# Patient Record
Sex: FEMALE | Race: WHITE | NOT HISPANIC OR LATINO | URBAN - METROPOLITAN AREA
[De-identification: names, ages, dates, MRNs, and addresses within clinical notes are randomized per-mention and may not be internally consistent; named-entity substitution may affect disease eponyms.]

---

## 2017-11-05 ENCOUNTER — EMERGENCY (EMERGENCY)
Facility: HOSPITAL | Age: 72
LOS: 0 days | Discharge: HOME | End: 2017-11-05

## 2017-11-10 DIAGNOSIS — M79.89 OTHER SPECIFIED SOFT TISSUE DISORDERS: ICD-10-CM

## 2017-11-10 DIAGNOSIS — Z79.82 LONG TERM (CURRENT) USE OF ASPIRIN: ICD-10-CM

## 2017-11-10 DIAGNOSIS — E78.00 PURE HYPERCHOLESTEROLEMIA, UNSPECIFIED: ICD-10-CM

## 2017-11-10 DIAGNOSIS — Z88.2 ALLERGY STATUS TO SULFONAMIDES: ICD-10-CM
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
## 2017-11-10 DIAGNOSIS — Z88.1 ALLERGY STATUS TO OTHER ANTIBIOTIC AGENTS STATUS: ICD-10-CM

## 2017-11-10 DIAGNOSIS — S46.392A OTHER INJURY OF MUSCLE, FASCIA AND TENDON OF TRICEPS, LEFT ARM, INITIAL ENCOUNTER: ICD-10-CM

## 2017-11-10 DIAGNOSIS — Z79.899 OTHER LONG TERM (CURRENT) DRUG THERAPY: ICD-10-CM

## 2017-11-10 DIAGNOSIS — S49.92XA UNSPECIFIED INJURY OF LEFT SHOULDER AND UPPER ARM, INITIAL ENCOUNTER: ICD-10-CM
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
## 2017-11-10 DIAGNOSIS — I10 ESSENTIAL (PRIMARY) HYPERTENSION: ICD-10-CM

## 2017-11-10 PROBLEM — Z00.00 ENCOUNTER FOR PREVENTIVE HEALTH EXAMINATION: Status: ACTIVE | Noted: 2017-11-10

## 2017-12-11 ENCOUNTER — OUTPATIENT (OUTPATIENT)
Dept: OUTPATIENT SERVICES | Facility: HOSPITAL | Age: 72
LOS: 1 days | Discharge: HOME | End: 2017-12-11

## 2017-12-11 DIAGNOSIS — M79.602 PAIN IN LEFT ARM: ICD-10-CM

## 2020-09-03 ENCOUNTER — APPOINTMENT (OUTPATIENT)
Dept: UROLOGY | Facility: CLINIC | Age: 75
End: 2020-09-03
Payer: MEDICARE

## 2020-09-03 VITALS
HEART RATE: 81 BPM | HEIGHT: 65 IN | BODY MASS INDEX: 41.65 KG/M2 | WEIGHT: 250 LBS | DIASTOLIC BLOOD PRESSURE: 73 MMHG | TEMPERATURE: 97.3 F | SYSTOLIC BLOOD PRESSURE: 119 MMHG

## 2020-09-03 DIAGNOSIS — I10 ESSENTIAL (PRIMARY) HYPERTENSION: ICD-10-CM

## 2020-09-03 DIAGNOSIS — Z87.891 PERSONAL HISTORY OF NICOTINE DEPENDENCE: ICD-10-CM

## 2020-09-03 DIAGNOSIS — E78.00 PURE HYPERCHOLESTEROLEMIA, UNSPECIFIED: ICD-10-CM

## 2020-09-03 DIAGNOSIS — Z78.9 OTHER SPECIFIED HEALTH STATUS: ICD-10-CM

## 2020-09-03 PROCEDURE — 99203 OFFICE O/P NEW LOW 30 MIN: CPT

## 2020-09-03 RX ORDER — LORATADINE 10 MG/1
10 CAPSULE, LIQUID FILLED ORAL
Refills: 0 | Status: ACTIVE | COMMUNITY

## 2020-09-03 RX ORDER — ATORVASTATIN CALCIUM 10 MG/1
10 TABLET, FILM COATED ORAL
Refills: 0 | Status: ACTIVE | COMMUNITY

## 2020-09-03 RX ORDER — FUROSEMIDE 20 MG/1
20 TABLET ORAL
Refills: 0 | Status: ACTIVE | COMMUNITY

## 2020-09-03 RX ORDER — METFORMIN HYDROCHLORIDE 500 MG/1
500 TABLET, COATED ORAL
Refills: 0 | Status: ACTIVE | COMMUNITY

## 2020-09-03 NOTE — HISTORY OF PRESENT ILLNESS
[None] : There is no radiation [7] : 7 [Gradual] : gradual [Moderate] : moderate in severity [Constant] : constantly [Unchanged] : unchanged [FreeTextEntry1] : Justin is a 75-year-old morbidly obese female who for some time at least 6 to 12 months if not more has had Mid to Lower thoracic Back pain. Bothersome or when she's moving or sometimes even lying flat. If she's in a chair with good support or relatively still it doesn't bother her. Going on and off a step getting in and out of the chair is excruciatingly painful. She had been seeing a rehabilitation therapist when she was in [place] which helped. She went down to [place] for the winter did not see a therapist and the pain got quite severe. She had numerous x-rays ultrasounds CAT scans etc. which showed bilateral cyst on the left going up to 11 cm (prior studies done in 2005. [place] showed a small left renal cyst but that was 15 years ago. Someone told her they think the pain is due to the system therefore she sent to a urologist.\par \par She denies any voiding issues. [de-identified] : Mid to lower thoracic back pain [de-identified] : For at least six perhaps up to 12 months [de-identified] : Movement [de-identified] : Rehabilitation medicine, good back support

## 2020-09-03 NOTE — LETTER HEADER
[FreeTextEntry3] : Elisa Kirk M.D.\par Director of Urology\par [facility]\par [address] [place] [address], [address]\[place] [address]

## 2020-09-03 NOTE — LETTER BODY
[Dear  ___] : Dear  [unfilled], [Consult Letter:] : I had the pleasure of evaluating your patient, [unfilled]. [Please see my note below.] : Please see my note below. [Sincerely,] : Sincerely, [Consult Closing:] : Thank you very much for allowing me to participate in the care of this patient.  If you have any questions, please do not hesitate to contact me. [FreeTextEntry2] : Josiane Brink MD\par [address]\[place] [address] \par

## 2020-09-03 NOTE — ASSESSMENT
[FreeTextEntry1] : You I don't see anything that recently looks at the kidneys other than a CT report that says bilateral cyst without being specific and that radiologist order back pain is due to the cyst. If you do rehab you feel better you start rehab you feel worse your pain bothers you when you move but not when you're lying still you have a use panniculus which is pulling you forward while you walk to the point that you are afraid you will fall in effect recently Jessica Levine knows I don't think the issue here is urologic.\par \par I would like to have a solid study of her kidneys at least as a baseline so we will sent her for a renal ultrasound and we can review that with telemedicine. However at this point I think she needs to go to a rehabilitation medicine doctor and start physical therapy\par \par she also needs nutrition counseling ? bariatrics consult

## 2020-09-03 NOTE — PHYSICAL EXAM
[General Appearance - Well Nourished] : well nourished [General Appearance - Well Developed] : well developed [Well Groomed] : well groomed [General Appearance - In No Acute Distress] : no acute distress [Normal Appearance] : normal appearance [] : no respiratory distress [Respiration, Rhythm And Depth] : normal respiratory rhythm and effort [Abdomen Soft] : soft [Abdomen Tenderness] : non-tender [Exaggerated Use Of Accessory Muscles For Inspiration] : no accessory muscle use [Costovertebral Angle Tenderness] : no ~M costovertebral angle tenderness [Oriented To Time, Place, And Person] : oriented to person, place, and time [Affect] : the affect was normal [Not Anxious] : not anxious [Mood] : the mood was normal [FreeTextEntry1] : dtr normal

## 2020-09-24 ENCOUNTER — APPOINTMENT (OUTPATIENT)
Dept: UROLOGY | Facility: CLINIC | Age: 75
End: 2020-09-24
Payer: MEDICARE

## 2020-09-24 DIAGNOSIS — N28.1 CYST OF KIDNEY, ACQUIRED: ICD-10-CM

## 2020-09-24 PROCEDURE — 99214 OFFICE O/P EST MOD 30 MIN: CPT | Mod: 95

## 2020-09-24 NOTE — HISTORY OF PRESENT ILLNESS
[Home] : at home, [unfilled] , at the time of the visit. [Medical Office: ([facility])___] : at the medical office located in  [FreeTextEntry3] : he has received, reviewed and agreed to the telemedicine consent  [FreeTextEntry1] : I communicated with her by her cell phone at [phone] and we will use her husbands phone at \par [phone]  any email communications will be sent to [email or web address]\par \par Twyla is a 75-year-old female seen on September 3, 2020 for intermittent mid to lower thoracic back pain that sounded more musculoskeletal. One of her studies that showed bilateral cyst with one on the left going up to 11 cm and that order pain was due to the cyst. She had brought a disk but it didn’t function and I felt that if you do rehab you feel better if you stop you feel worse your pain bothers you when you are moving not when you’re lying still and you have a huge panniculus pulling you forward to the point that you have trouble walking I did think it was urologic. I wanted to get a kidney study so we have something as a baseline so we sent her for renal ultrasound. She is now using telemedicine to review it.\par  [7] : 7 [None] : There is no radiation [Gradual] : gradual [Constant] : constantly [Moderate] : moderate in severity [Unchanged] : unchanged [de-identified] : Mid to lower thoracic back pain [de-identified] : For at least six perhaps up to 12 months [de-identified] : Movement [de-identified] : Rehabilitation medicine, good back support

## 2020-09-24 NOTE — LETTER HEADER
[FreeTextEntry3] : Elisa Kirk M.D.\par Director of Urology\par [facility]\par [address] [place] [address], [address]\[place] [address]

## 2020-09-24 NOTE — LETTER BODY
[Dear  ___] : Dear  [unfilled], [Courtesy Letter:] : I had the pleasure of seeing your patient, [unfilled], in my office today. [Please see my note below.] : Please see my note below. [Sincerely,] : Sincerely, [FreeTextEntry2] : Josiane Brink MD\par [address]\[place] [address] \par

## 2020-09-24 NOTE — PHYSICAL EXAM
[General Appearance - Well Developed] : well developed [General Appearance - Well Nourished] : well nourished [Normal Appearance] : normal appearance [Well Groomed] : well groomed [General Appearance - In No Acute Distress] : no acute distress [FreeTextEntry1] : morbidly obese with large paniculus [] : no respiratory distress [Respiration, Rhythm And Depth] : normal respiratory rhythm and effort [Exaggerated Use Of Accessory Muscles For Inspiration] : no accessory muscle use [Oriented To Time, Place, And Person] : oriented to person, place, and time [Affect] : the affect was normal [Mood] : the mood was normal [Not Anxious] : not anxious [Normal Station and Gait] : the gait and station were normal for the patient's age

## 2020-09-24 NOTE — ASSESSMENT
[FreeTextEntry1] : Practically speaking these are benign cysts though they are enlarging they do not appear to be obstructing her kidney and they are definitely not the cause of her back pain. I believe this is her weight stressing her back causing her problems. As before she needs a nutritionist and physical therapist. This is not something that I can refer her to she misunderstood me or I misstated the last time is something that she needs to arrange through PCP.

## 2022-07-10 ENCOUNTER — INPATIENT (INPATIENT)
Facility: HOSPITAL | Age: 77
LOS: 2 days | Discharge: HOME | End: 2022-07-13
Attending: INTERNAL MEDICINE | Admitting: INTERNAL MEDICINE

## 2022-07-10 VITALS
TEMPERATURE: 98 F | RESPIRATION RATE: 16 BRPM | HEART RATE: 74 BPM | WEIGHT: 199.96 LBS | SYSTOLIC BLOOD PRESSURE: 153 MMHG | OXYGEN SATURATION: 97 % | DIASTOLIC BLOOD PRESSURE: 75 MMHG

## 2022-07-10 LAB
ALBUMIN SERPL ELPH-MCNC: 4.4 G/DL — SIGNIFICANT CHANGE UP (ref 3.5–5.2)
ALP SERPL-CCNC: 100 U/L — SIGNIFICANT CHANGE UP (ref 30–115)
ALT FLD-CCNC: 38 U/L — SIGNIFICANT CHANGE UP (ref 0–41)
ANION GAP SERPL CALC-SCNC: 15 MMOL/L — HIGH (ref 7–14)
APTT BLD: 28.9 SEC — SIGNIFICANT CHANGE UP (ref 27–39.2)
AST SERPL-CCNC: 28 U/L — SIGNIFICANT CHANGE UP (ref 0–41)
BASOPHILS # BLD AUTO: 0.06 K/UL — SIGNIFICANT CHANGE UP (ref 0–0.2)
BASOPHILS NFR BLD AUTO: 0.8 % — SIGNIFICANT CHANGE UP (ref 0–1)
BILIRUB SERPL-MCNC: 0.4 MG/DL — SIGNIFICANT CHANGE UP (ref 0.2–1.2)
BUN SERPL-MCNC: 16 MG/DL — SIGNIFICANT CHANGE UP (ref 10–20)
CALCIUM SERPL-MCNC: 9.6 MG/DL — SIGNIFICANT CHANGE UP (ref 8.5–10.1)
CHLORIDE SERPL-SCNC: 104 MMOL/L — SIGNIFICANT CHANGE UP (ref 98–110)
CO2 SERPL-SCNC: 26 MMOL/L — SIGNIFICANT CHANGE UP (ref 17–32)
CREAT SERPL-MCNC: 0.9 MG/DL — SIGNIFICANT CHANGE UP (ref 0.7–1.5)
EGFR: 67 ML/MIN/1.73M2 — SIGNIFICANT CHANGE UP
EOSINOPHIL # BLD AUTO: 0.16 K/UL — SIGNIFICANT CHANGE UP (ref 0–0.7)
EOSINOPHIL NFR BLD AUTO: 2.1 % — SIGNIFICANT CHANGE UP (ref 0–8)
GLUCOSE SERPL-MCNC: 127 MG/DL — HIGH (ref 70–99)
HCT VFR BLD CALC: 43.9 % — SIGNIFICANT CHANGE UP (ref 37–47)
HGB BLD-MCNC: 14.4 G/DL — SIGNIFICANT CHANGE UP (ref 12–16)
IMM GRANULOCYTES NFR BLD AUTO: 0.4 % — HIGH (ref 0.1–0.3)
INR BLD: 1.05 RATIO — SIGNIFICANT CHANGE UP (ref 0.65–1.3)
LYMPHOCYTES # BLD AUTO: 2.01 K/UL — SIGNIFICANT CHANGE UP (ref 1.2–3.4)
LYMPHOCYTES # BLD AUTO: 26.4 % — SIGNIFICANT CHANGE UP (ref 20.5–51.1)
MAGNESIUM SERPL-MCNC: 2 MG/DL — SIGNIFICANT CHANGE UP (ref 1.8–2.4)
MCHC RBC-ENTMCNC: 26.8 PG — LOW (ref 27–31)
MCHC RBC-ENTMCNC: 32.8 G/DL — SIGNIFICANT CHANGE UP (ref 32–37)
MCV RBC AUTO: 81.8 FL — SIGNIFICANT CHANGE UP (ref 81–99)
MONOCYTES # BLD AUTO: 0.53 K/UL — SIGNIFICANT CHANGE UP (ref 0.1–0.6)
MONOCYTES NFR BLD AUTO: 7 % — SIGNIFICANT CHANGE UP (ref 1.7–9.3)
NEUTROPHILS # BLD AUTO: 4.82 K/UL — SIGNIFICANT CHANGE UP (ref 1.4–6.5)
NEUTROPHILS NFR BLD AUTO: 63.3 % — SIGNIFICANT CHANGE UP (ref 42.2–75.2)
NRBC # BLD: 0 /100 WBCS — SIGNIFICANT CHANGE UP (ref 0–0)
PLATELET # BLD AUTO: 300 K/UL — SIGNIFICANT CHANGE UP (ref 130–400)
POTASSIUM SERPL-MCNC: 3.9 MMOL/L — SIGNIFICANT CHANGE UP (ref 3.5–5)
POTASSIUM SERPL-SCNC: 3.9 MMOL/L — SIGNIFICANT CHANGE UP (ref 3.5–5)
PROT SERPL-MCNC: 6.9 G/DL — SIGNIFICANT CHANGE UP (ref 6–8)
PROTHROM AB SERPL-ACNC: 12.1 SEC — SIGNIFICANT CHANGE UP (ref 9.95–12.87)
RBC # BLD: 5.37 M/UL — SIGNIFICANT CHANGE UP (ref 4.2–5.4)
RBC # FLD: 15.8 % — HIGH (ref 11.5–14.5)
SARS-COV-2 RNA SPEC QL NAA+PROBE: SIGNIFICANT CHANGE UP
SODIUM SERPL-SCNC: 145 MMOL/L — SIGNIFICANT CHANGE UP (ref 135–146)
TROPONIN T SERPL-MCNC: <0.01 NG/ML — SIGNIFICANT CHANGE UP
WBC # BLD: 7.61 K/UL — SIGNIFICANT CHANGE UP (ref 4.8–10.8)
WBC # FLD AUTO: 7.61 K/UL — SIGNIFICANT CHANGE UP (ref 4.8–10.8)

## 2022-07-10 PROCEDURE — 93010 ELECTROCARDIOGRAM REPORT: CPT

## 2022-07-10 PROCEDURE — 99285 EMERGENCY DEPT VISIT HI MDM: CPT | Mod: FS

## 2022-07-10 PROCEDURE — 71045 X-RAY EXAM CHEST 1 VIEW: CPT | Mod: 26

## 2022-07-10 RX ORDER — SODIUM CHLORIDE 9 MG/ML
1000 INJECTION, SOLUTION INTRAVENOUS ONCE
Refills: 0 | Status: COMPLETED | OUTPATIENT
Start: 2022-07-10 | End: 2022-07-10

## 2022-07-10 RX ORDER — GLUCAGON INJECTION, SOLUTION 0.5 MG/.1ML
2 INJECTION, SOLUTION SUBCUTANEOUS ONCE
Refills: 0 | Status: COMPLETED | OUTPATIENT
Start: 2022-07-10 | End: 2022-07-10

## 2022-07-10 RX ADMIN — GLUCAGON INJECTION, SOLUTION 2 MILLIGRAM(S): 0.5 INJECTION, SOLUTION SUBCUTANEOUS at 21:30

## 2022-07-10 RX ADMIN — SODIUM CHLORIDE 1000 MILLILITER(S): 9 INJECTION, SOLUTION INTRAVENOUS at 19:19

## 2022-07-10 RX ADMIN — GLUCAGON INJECTION, SOLUTION 2 MILLIGRAM(S): 0.5 INJECTION, SOLUTION SUBCUTANEOUS at 20:09

## 2022-07-10 NOTE — ED PROVIDER NOTE - NS ED ATTENDING STATEMENT MOD
This was a shared visit with the AUDIE. I reviewed and verified the documentation and independently performed the documented:

## 2022-07-10 NOTE — ED PROVIDER NOTE - ATTENDING APP SHARED VISIT CONTRIBUTION OF CARE
77-year-old female to ED with difficulty swallowing.  Patient states last night she tried to eat extra chocolate and suddenly felt her throat get stuck and was not able to drink or swallow anything since then.  Given to ED has not difficulty tolerating secretions.  No nausea vomiting otherwise well-appearing nontoxic.  But feeling very uncomfortable. Exam as noted

## 2022-07-10 NOTE — ED PROVIDER NOTE - NS ED ROS FT
Review of Systems  Constitutional:  No fever, chills.  Eyes:  No visual changes, eye pain, or discharge.  ENMT:  No hearing changes, pain, or discharge. No nasal congestion, discharge, or bleeding. No throat pain, swelling. (+) difficulty swallowing  Cardiac:  No chest pain, palpitations, syncope, or edema.  Respiratory:  No dyspnea, cough. No hemoptysis.  GI:  No nausea, vomiting, diarrhea, or abdominal pain.   :  No dysuria, hematuria, frequency, or burning.   MS:  No back pain.  Skin:  No skin rash, pruritis, jaundice, or lesions.  Neuro:  No headache, dizziness, loss of sensation, or focal weakness.  No change in mental status.

## 2022-07-10 NOTE — ED PROVIDER NOTE - PHYSICAL EXAMINATION
VITAL SIGNS: I have reviewed nursing notes and confirm.  CONSTITUTIONAL: 78 yo F sitting up on stretcher; in no acute distress.  SKIN: Skin exam is warm and dry, no acute rash.  HEAD: Normocephalic; atraumatic.  EYES: Conjunctiva and sclera clear.  ENT: No nasal discharge; airway clear. Oropharynx clear, uvula midline.   CARD: S1, S2 normal; no murmurs, gallops, or rubs. Regular rate and rhythm.  RESP: No wheezes, rales or rhonchi. Speaking in full sentences.   ABD: Normal bowel sounds; soft; non-distended; non-tender; No rebound or guarding. No CVA tenderness.  EXT: Normal ROM. No clubbing, cyanosis or edema.  NEURO: Alert, oriented. Grossly unremarkable. No focal deficits.

## 2022-07-10 NOTE — ED ADULT TRIAGE NOTE - CHIEF COMPLAINT QUOTE
Pt c/p inability to swallow food or water. Pt states over the past week and a half it has gotten worse. Pt states this has happened to her in the past but never followed up with a doctor for it. Pt reports no difficulty breathing. Pt c/p inability to swallow food, water, or saliva. Pt states over the past week and a half it has gotten worse. Pt states this has happened to her in the past but never followed up with a doctor for it. Pt reports no difficulty breathing.

## 2022-07-10 NOTE — ED ADULT NURSE NOTE - CHIEF COMPLAINT QUOTE
Pt c/p inability to swallow food, water, or saliva. Pt states over the past week and a half it has gotten worse. Pt states this has happened to her in the past but never followed up with a doctor for it. Pt reports no difficulty breathing.

## 2022-07-10 NOTE — ED PROVIDER NOTE - OBJECTIVE STATEMENT
76 yo F with PMHx of HTN, HLD, and DM presents to the ED c/o worsening dysphagia x 4 weeks. Pt states she has had progressively increasing difficulty swallowing solids which progressed to inability to swallow liquids today. Pt states she last ate today around 5 PM--had a piece of chocolate and has been unable to tolerate liquid intake since. Prior to that she ate a hot dog and a small piece of chicken in the afternoon. She denies other complaints. Pt denies fever, chills, nausea, vomiting, abdominal pain, diarrhea, headache, dizziness, weakness, chest pain, SOB, back pain, LOC, trauma, urinary symptoms, cough, calf pain/swelling, recent travel, recent surgery.

## 2022-07-10 NOTE — ED ADULT NURSE NOTE - OBJECTIVE STATEMENT
Pt c/p inability to swallow food, water, or saliva. Pt states over the past week and a half it has gotten worse. Pt states this has happened to her in the past but never followed up with a doctor for it. Pt reports no difficulty breathing. No SOB or distress noted.

## 2022-07-11 ENCOUNTER — TRANSCRIPTION ENCOUNTER (OUTPATIENT)
Age: 77
End: 2022-07-11

## 2022-07-11 ENCOUNTER — RESULT REVIEW (OUTPATIENT)
Age: 77
End: 2022-07-11

## 2022-07-11 DIAGNOSIS — Z90.49 ACQUIRED ABSENCE OF OTHER SPECIFIED PARTS OF DIGESTIVE TRACT: Chronic | ICD-10-CM

## 2022-07-11 LAB
A1C WITH ESTIMATED AVERAGE GLUCOSE RESULT: 6.5 % — HIGH (ref 4–5.6)
ALBUMIN SERPL ELPH-MCNC: 4 G/DL — SIGNIFICANT CHANGE UP (ref 3.5–5.2)
ALP SERPL-CCNC: 90 U/L — SIGNIFICANT CHANGE UP (ref 30–115)
ALT FLD-CCNC: 31 U/L — SIGNIFICANT CHANGE UP (ref 0–41)
ANION GAP SERPL CALC-SCNC: 13 MMOL/L — SIGNIFICANT CHANGE UP (ref 7–14)
AST SERPL-CCNC: 20 U/L — SIGNIFICANT CHANGE UP (ref 0–41)
BASOPHILS # BLD AUTO: 0.07 K/UL — SIGNIFICANT CHANGE UP (ref 0–0.2)
BASOPHILS NFR BLD AUTO: 0.9 % — SIGNIFICANT CHANGE UP (ref 0–1)
BILIRUB SERPL-MCNC: 0.5 MG/DL — SIGNIFICANT CHANGE UP (ref 0.2–1.2)
BUN SERPL-MCNC: 13 MG/DL — SIGNIFICANT CHANGE UP (ref 10–20)
CALCIUM SERPL-MCNC: 8.8 MG/DL — SIGNIFICANT CHANGE UP (ref 8.5–10.1)
CHLORIDE SERPL-SCNC: 107 MMOL/L — SIGNIFICANT CHANGE UP (ref 98–110)
CHOLEST SERPL-MCNC: 143 MG/DL — SIGNIFICANT CHANGE UP
CO2 SERPL-SCNC: 25 MMOL/L — SIGNIFICANT CHANGE UP (ref 17–32)
CREAT SERPL-MCNC: 0.8 MG/DL — SIGNIFICANT CHANGE UP (ref 0.7–1.5)
EGFR: 77 ML/MIN/1.73M2 — SIGNIFICANT CHANGE UP
EOSINOPHIL # BLD AUTO: 0.17 K/UL — SIGNIFICANT CHANGE UP (ref 0–0.7)
EOSINOPHIL NFR BLD AUTO: 2.1 % — SIGNIFICANT CHANGE UP (ref 0–8)
ESTIMATED AVERAGE GLUCOSE: 140 MG/DL — HIGH (ref 68–114)
GLUCOSE BLDC GLUCOMTR-MCNC: 106 MG/DL — HIGH (ref 70–99)
GLUCOSE BLDC GLUCOMTR-MCNC: 108 MG/DL — HIGH (ref 70–99)
GLUCOSE BLDC GLUCOMTR-MCNC: 131 MG/DL — HIGH (ref 70–99)
GLUCOSE SERPL-MCNC: 111 MG/DL — HIGH (ref 70–99)
HCT VFR BLD CALC: 38 % — SIGNIFICANT CHANGE UP (ref 37–47)
HCV AB S/CO SERPL IA: 0.03 COI — SIGNIFICANT CHANGE UP
HCV AB SERPL-IMP: SIGNIFICANT CHANGE UP
HDLC SERPL-MCNC: 42 MG/DL — LOW
HGB BLD-MCNC: 12.5 G/DL — SIGNIFICANT CHANGE UP (ref 12–16)
IMM GRANULOCYTES NFR BLD AUTO: 0.2 % — SIGNIFICANT CHANGE UP (ref 0.1–0.3)
LIPID PNL WITH DIRECT LDL SERPL: 75 MG/DL — SIGNIFICANT CHANGE UP
LYMPHOCYTES # BLD AUTO: 2.45 K/UL — SIGNIFICANT CHANGE UP (ref 1.2–3.4)
LYMPHOCYTES # BLD AUTO: 30.2 % — SIGNIFICANT CHANGE UP (ref 20.5–51.1)
MCHC RBC-ENTMCNC: 26.9 PG — LOW (ref 27–31)
MCHC RBC-ENTMCNC: 32.9 G/DL — SIGNIFICANT CHANGE UP (ref 32–37)
MCV RBC AUTO: 81.9 FL — SIGNIFICANT CHANGE UP (ref 81–99)
MONOCYTES # BLD AUTO: 0.5 K/UL — SIGNIFICANT CHANGE UP (ref 0.1–0.6)
MONOCYTES NFR BLD AUTO: 6.2 % — SIGNIFICANT CHANGE UP (ref 1.7–9.3)
NEUTROPHILS # BLD AUTO: 4.9 K/UL — SIGNIFICANT CHANGE UP (ref 1.4–6.5)
NEUTROPHILS NFR BLD AUTO: 60.4 % — SIGNIFICANT CHANGE UP (ref 42.2–75.2)
NON HDL CHOLESTEROL: 101 MG/DL — SIGNIFICANT CHANGE UP
NRBC # BLD: 0 /100 WBCS — SIGNIFICANT CHANGE UP (ref 0–0)
PLATELET # BLD AUTO: 260 K/UL — SIGNIFICANT CHANGE UP (ref 130–400)
POTASSIUM SERPL-MCNC: 3.7 MMOL/L — SIGNIFICANT CHANGE UP (ref 3.5–5)
POTASSIUM SERPL-SCNC: 3.7 MMOL/L — SIGNIFICANT CHANGE UP (ref 3.5–5)
PROT SERPL-MCNC: 5.9 G/DL — LOW (ref 6–8)
RBC # BLD: 4.64 M/UL — SIGNIFICANT CHANGE UP (ref 4.2–5.4)
RBC # FLD: 15.9 % — HIGH (ref 11.5–14.5)
SODIUM SERPL-SCNC: 145 MMOL/L — SIGNIFICANT CHANGE UP (ref 135–146)
TRIGL SERPL-MCNC: 130 MG/DL — SIGNIFICANT CHANGE UP
WBC # BLD: 8.11 K/UL — SIGNIFICANT CHANGE UP (ref 4.8–10.8)
WBC # FLD AUTO: 8.11 K/UL — SIGNIFICANT CHANGE UP (ref 4.8–10.8)

## 2022-07-11 PROCEDURE — 88305 TISSUE EXAM BY PATHOLOGIST: CPT | Mod: 26

## 2022-07-11 PROCEDURE — 88312 SPECIAL STAINS GROUP 1: CPT | Mod: 26

## 2022-07-11 PROCEDURE — 99223 1ST HOSP IP/OBS HIGH 75: CPT

## 2022-07-11 PROCEDURE — 43239 EGD BIOPSY SINGLE/MULTIPLE: CPT

## 2022-07-11 PROCEDURE — 93010 ELECTROCARDIOGRAM REPORT: CPT

## 2022-07-11 PROCEDURE — 99233 SBSQ HOSP IP/OBS HIGH 50: CPT | Mod: 25

## 2022-07-11 RX ORDER — ASPIRIN/CALCIUM CARB/MAGNESIUM 324 MG
325 TABLET ORAL DAILY
Refills: 0 | Status: DISCONTINUED | OUTPATIENT
Start: 2022-07-11 | End: 2022-07-12

## 2022-07-11 RX ORDER — ATORVASTATIN CALCIUM 80 MG/1
1 TABLET, FILM COATED ORAL
Qty: 0 | Refills: 0 | DISCHARGE

## 2022-07-11 RX ORDER — FUROSEMIDE 40 MG
1 TABLET ORAL
Qty: 0 | Refills: 0 | DISCHARGE

## 2022-07-11 RX ORDER — VALSARTAN 80 MG/1
80 TABLET ORAL DAILY
Refills: 0 | Status: DISCONTINUED | OUTPATIENT
Start: 2022-07-11 | End: 2022-07-13

## 2022-07-11 RX ORDER — ONDANSETRON 8 MG/1
4 TABLET, FILM COATED ORAL EVERY 8 HOURS
Refills: 0 | Status: DISCONTINUED | OUTPATIENT
Start: 2022-07-11 | End: 2022-07-13

## 2022-07-11 RX ORDER — DEXTROSE 50 % IN WATER 50 %
25 SYRINGE (ML) INTRAVENOUS ONCE
Refills: 0 | Status: DISCONTINUED | OUTPATIENT
Start: 2022-07-11 | End: 2022-07-13

## 2022-07-11 RX ORDER — ONDANSETRON 8 MG/1
4 TABLET, FILM COATED ORAL ONCE
Refills: 0 | Status: DISCONTINUED | OUTPATIENT
Start: 2022-07-11 | End: 2022-07-12

## 2022-07-11 RX ORDER — DEXTROSE 50 % IN WATER 50 %
12.5 SYRINGE (ML) INTRAVENOUS ONCE
Refills: 0 | Status: DISCONTINUED | OUTPATIENT
Start: 2022-07-11 | End: 2022-07-13

## 2022-07-11 RX ORDER — ACETAMINOPHEN 500 MG
650 TABLET ORAL EVERY 6 HOURS
Refills: 0 | Status: DISCONTINUED | OUTPATIENT
Start: 2022-07-11 | End: 2022-07-13

## 2022-07-11 RX ORDER — OMEGA-3 ACID ETHYL ESTERS 1 G
1 CAPSULE ORAL
Qty: 0 | Refills: 0 | DISCHARGE

## 2022-07-11 RX ORDER — METFORMIN HYDROCHLORIDE 850 MG/1
1 TABLET ORAL
Qty: 0 | Refills: 0 | DISCHARGE

## 2022-07-11 RX ORDER — ATORVASTATIN CALCIUM 80 MG/1
10 TABLET, FILM COATED ORAL AT BEDTIME
Refills: 0 | Status: DISCONTINUED | OUTPATIENT
Start: 2022-07-11 | End: 2022-07-13

## 2022-07-11 RX ORDER — FUROSEMIDE 40 MG
20 TABLET ORAL DAILY
Refills: 0 | Status: DISCONTINUED | OUTPATIENT
Start: 2022-07-11 | End: 2022-07-13

## 2022-07-11 RX ORDER — DEXTROSE 50 % IN WATER 50 %
15 SYRINGE (ML) INTRAVENOUS ONCE
Refills: 0 | Status: DISCONTINUED | OUTPATIENT
Start: 2022-07-11 | End: 2022-07-13

## 2022-07-11 RX ORDER — SODIUM CHLORIDE 9 MG/ML
1000 INJECTION, SOLUTION INTRAVENOUS
Refills: 0 | Status: DISCONTINUED | OUTPATIENT
Start: 2022-07-11 | End: 2022-07-13

## 2022-07-11 RX ORDER — HYDROCHLOROTHIAZIDE 25 MG
12.5 TABLET ORAL DAILY
Refills: 0 | Status: DISCONTINUED | OUTPATIENT
Start: 2022-07-11 | End: 2022-07-13

## 2022-07-11 RX ORDER — SODIUM CHLORIDE 9 MG/ML
1000 INJECTION, SOLUTION INTRAVENOUS
Refills: 0 | Status: DISCONTINUED | OUTPATIENT
Start: 2022-07-11 | End: 2022-07-12

## 2022-07-11 RX ORDER — PANTOPRAZOLE SODIUM 20 MG/1
40 TABLET, DELAYED RELEASE ORAL DAILY
Refills: 0 | Status: DISCONTINUED | OUTPATIENT
Start: 2022-07-11 | End: 2022-07-12

## 2022-07-11 RX ORDER — ASPIRIN/CALCIUM CARB/MAGNESIUM 324 MG
1 TABLET ORAL
Qty: 0 | Refills: 0 | DISCHARGE

## 2022-07-11 RX ORDER — LANOLIN ALCOHOL/MO/W.PET/CERES
3 CREAM (GRAM) TOPICAL AT BEDTIME
Refills: 0 | Status: DISCONTINUED | OUTPATIENT
Start: 2022-07-11 | End: 2022-07-13

## 2022-07-11 RX ORDER — ENOXAPARIN SODIUM 100 MG/ML
40 INJECTION SUBCUTANEOUS EVERY 24 HOURS
Refills: 0 | Status: DISCONTINUED | OUTPATIENT
Start: 2022-07-11 | End: 2022-07-13

## 2022-07-11 RX ORDER — VALSARTAN 80 MG/1
1 TABLET ORAL
Qty: 0 | Refills: 0 | DISCHARGE

## 2022-07-11 RX ORDER — GLUCAGON INJECTION, SOLUTION 0.5 MG/.1ML
1 INJECTION, SOLUTION SUBCUTANEOUS ONCE
Refills: 0 | Status: DISCONTINUED | OUTPATIENT
Start: 2022-07-11 | End: 2022-07-13

## 2022-07-11 RX ORDER — INSULIN LISPRO 100/ML
VIAL (ML) SUBCUTANEOUS
Refills: 0 | Status: DISCONTINUED | OUTPATIENT
Start: 2022-07-11 | End: 2022-07-13

## 2022-07-11 RX ADMIN — SODIUM CHLORIDE 90 MILLILITER(S): 9 INJECTION, SOLUTION INTRAVENOUS at 01:32

## 2022-07-11 RX ADMIN — SODIUM CHLORIDE 75 MILLILITER(S): 9 INJECTION, SOLUTION INTRAVENOUS at 21:52

## 2022-07-11 NOTE — H&P ADULT - ATTENDING COMMENTS
#Dysphagia, to solids now liquids  cont lr 75 cc/hr  egd  f/u gi  s+s; potential mbs  diet as tolerated    #Progress Note Handoff:  Pending (specify):  Consults_________, Tests________, Test Results_______, Other______egd___  Family discussion:   Disposition: Home___/SNF___/Other________/Unknown at this time__x______

## 2022-07-11 NOTE — H&P ADULT - HISTORY OF PRESENT ILLNESS
77 year old female with history of   presents with worsening esophageal dysphagia over the last 4 weeks. Pt states her symptoms started with regurgitation of solids initially now progressed to liquids. She came in to the ED because she had an acute episode of choking after drinking water. She states she can't keep any liquids down and has the constant fear that she will choke. She can keep some solids down depending on the texture. She denies any pain while swallowing, denies N/V/D, hematemesis, constipation, chest pain, shortness of breath, no family hx of esophageal cancers. no smoking, no drinking, no drug use. She follows Dr. Brink PCP o/p. She has not had any formal diagnostic work up for this issue.    in the ED,pt's VS T(C): 37 (07-10-22 @ 23:00), Max: 37 (07-10-22 @ 23:00)  HR: 63 (07-10-22 @ 23:00) (63 - 74)  BP: 140/61 (07-10-22 @ 23:00) (140/61 - 153/75)  RR: 18 (07-10-22 @ 23:00) (16 - 18)  SpO2: 97% (07-10-22 @ 23:00) (97% - 97%), labs done showed wbc 7.61, Hb 14.4, INR 1.05, Na 145, K 3.9, Cr 0.9, BUN 16, trop negative, Mg 2  EKG: NSR, CXR:   pt received   pt is admitted for workup/management 77 year old female with history of DM, HTN, HLD  presents with worsening esophageal dysphagia over the last 4 weeks. Pt states her symptoms started with regurgitation of solids initially now progressed to liquids. She came in to the ED because she had an acute episode of choking after drinking water. She states she can't keep any liquids down and has the constant fear that she will choke. She can keep some solids down depending on the texture. She denies any pain while swallowing, denies N/V/D, hematemesis, constipation, chest pain, shortness of breath, no family hx of esophageal cancers. no smoking, no drinking, no drug use. She follows Dr. Brink PCP o/p. She has not had any formal diagnostic work up for this issue.    in the ED,pt's VS T(C): 37 (07-10-22 @ 23:00), Max: 37 (07-10-22 @ 23:00)  HR: 63 (07-10-22 @ 23:00) (63 - 74)  BP: 140/61 (07-10-22 @ 23:00) (140/61 - 153/75)  RR: 18 (07-10-22 @ 23:00) (16 - 18)  SpO2: 97% (07-10-22 @ 23:00) (97% - 97%), labs done showed wbc 7.61, Hb 14.4, INR 1.05, Na 145, K 3.9, Cr 0.9, BUN 16, trop negative, Mg 2  EKG: NSR, CXR: possible congestion, low lung volume L (pending official read)  pt received LR 1L bolus, 4mg glucagon  pt is admitted for workup/management esophageal dysphagia worsening with liquids

## 2022-07-11 NOTE — CHART NOTE - NSCHARTNOTEFT_GEN_A_CORE
EGD Impressions:  	Irregularity in the Z-line and gastroesophageal junction. (Biopsy).  	-multiple cold forceps biopsies were performed from distal and mid esophagus to rule out EoE. .  	Erythema in the stomach compatible with non-erosive gastritis.  	Normal mucosa in the whole examined duodenum.  	-no food bolus was noted in the esophagus. Was able to pass scope without any resistance.       Rec:     Speech and Swallow evaluation      Diet per Speech and swallow    Barium Esophagogram    Manometry study as outpatient      Protonix 40 mg QD    Hari Nolen   Gastroenterology fellow

## 2022-07-11 NOTE — CHART NOTE - NSCHARTNOTEFT_GEN_A_CORE
PACU ANESTHESIA ADMISSION NOTE      Procedure:   Post op diagnosis:      ____  Intubated  TV:______       Rate: ______      FiO2: ______    _x___  Patent Airway    _x___  Full return of protective reflexes    _x___  Full recovery from anesthesia / back to baseline status    Vitals:  T(C): 97.2  HR: 76  BP: 128/62  RR: 22  SpO2: 96%    Mental Status:  _x___ Awake   _____ Alert   _____ Drowsy   _____ Sedated    Nausea/Vomiting:  _x___  NO       ______Yes,   See Post - Op Orders         Pain Scale (0-10):  __0___    Treatment: _x___ None    ____ See Post - Op/PCA Orders    Post - Operative Fluids:   __x__ Oral   ____ See Post - Op Orders    Plan: Discharge:   _x___Home       _____Floor     _____Critical Care    _____  Other:_________________    Comments:  No anesthesia issues or complications noted.  Discharge when criteria met.

## 2022-07-11 NOTE — PATIENT PROFILE ADULT - TOBACCO USE
Reached patient with results of imaging-no cervical lymphadenopathy identified, 1 cm nodule in the thyroid noted. 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
Never smoker

## 2022-07-11 NOTE — H&P ADULT - NSHPPHYSICALEXAM_GEN_ALL_CORE
PHYSICAL EXAM:  GENERAL: NAD, AAO x 4  HEAD:  Atraumatic, Normocephalic  EYES: EOMI, conjunctiva and sclera white  NECK: Supple, No JVD; no tenderness, no lad; large tongue  CHEST/LUNG: Clear to auscultation bilaterally; No wheeze; No crackles; No accessory muscles used  HEART: Regular rate and rhythm; No murmurs;   ABDOMEN: Soft, Nontender, Nondistended; Bowel sounds present; No guarding  EXTREMITIES:  2+ Peripheral Pulses, No cyanosis or edema  NEUROLOGY: non-focal PHYSICAL EXAM:  GENERAL: NAD, AAO x 4  HEAD:  Atraumatic, Normocephalic  EYES: EOMI, conjunctiva and sclera white  NECK: Supple, No JVD; no tenderness, no lad; large tongue  CHEST/LUNG: Clear to auscultation bilaterally; No wheeze; No crackles; No accessory muscles used  HEART: Regular rate and rhythm; No murmurs;   ABDOMEN: Soft, Nontender, Nondistended; Bowel sounds present; No guarding  EXTREMITIES:  2+ Peripheral Pulses, No cyanosis or edema  NEUROLOGY: no weakness, moves all extremities, moves tongue in all movements, follows commands, no dysarthria/ stridor

## 2022-07-11 NOTE — PATIENT PROFILE ADULT - FALL HARM RISK - UNIVERSAL INTERVENTIONS
Bed in lowest position, wheels locked, appropriate side rails in place/Call bell, personal items and telephone in reach/Instruct patient to call for assistance before getting out of bed or chair/Non-slip footwear when patient is out of bed/[place] to call system/Physically safe environment - no spills, clutter or unnecessary equipment/Purposeful Proactive Rounding/Room/bathroom lighting operational, light cord in reach

## 2022-07-11 NOTE — H&P ADULT - ASSESSMENT
Impression  nonacute esophageal dysphagia without odynophagia  ho appendectomy/ splenectomy    PLAN  no smoking/drinking hx; no family hx cancer  per pt unable to tolerate barium swallow in ED; no record of it?  GI following, EGD scheduled in AM  NPO; SLP eval  maintenance fluids   dvt ppx  gi ppx  activity AAT  dispo from home; acute    #other problems       77 year old female with history of DM, HLD, HTN pw with worsening esophageal dysphagia initially with solids now worse with liquids, denies pain, but endorses feeling of food getting stuck and choking feeling with liquids along with regurgitation of some solids. Can tolerate some PO intake; no liquids. Denies spasms of esophagus. Going for EGD in AM. No fam hx of esophageal cancer, no ho radiation or previous esophageal ca.     Impression  nonacute esophageal dysphagia without odynophagia  r/o malignancy vs esophageal motility disorder   ho appendectomy/ splenectomy    PLAN  no smoking/drinking hx; no family hx cancer  per pt unable to tolerate barium swallow in ED; no record of it?  GI following, EGD scheduled in AM  NPO; SLP eval  maintenance fluids   dvt ppx  gi ppx  activity AAT  dispo from home; acute    #other problems    DM/ HTN/ HLD/ HF?  -on metformin at home 500mg bid  -start sliding scale, add lantus/lispro if sugars >200  -pt euvolemic on exam  -continue with home meds; valsartan, hctz, furosemide     77 year old female with history of DM, HLD, HTN pw with worsening esophageal dysphagia initially with solids now worse with liquids, denies pain, but endorses feeling of food getting stuck and choking feeling with liquids along with regurgitation of some solids. Can tolerate some PO intake; no liquids. Denies spasms of esophagus. Going for EGD in AM. No fam hx of esophageal cancer, no ho radiation or previous esophageal ca.     Impression  nonacute esophageal dysphagia without odynophagia  r/o malignancy vs esophageal motility disorder vs possible gastroparesis  ho appendectomy/ splenectomy    PLAN  no smoking/drinking hx; no family hx cancer  per pt unable to tolerate barium swallow in ED; no record of it?  GI following, EGD scheduled in AM  NPO; SLP eval  maintenance fluids   f/u a1c  dvt ppx  gi ppx  activity AAT  dispo from home; acute    #other problems    DM/ HTN/ HLD/ HF?  -on metformin at home 500mg bid  -start sliding scale, add lantus/lispro if sugars >200  -pt euvolemic on exam  -continue with home meds; valsartan, hctz, furosemide     77 year old female with history of DM, HLD, HTN pw with worsening esophageal dysphagia initially with solids now worse with liquids, denies pain, but endorses feeling of food getting stuck and choking feeling with liquids along with regurgitation of some solids. Can tolerate some PO intake; no liquids. Denies spasms of esophagus. Going for EGD in AM. No fam hx of esophageal cancer, no ho radiation or previous esophageal ca.     Impression  nonacute esophageal dysphagia without odynophagia  r/o malignancy vs esophageal motility disorder vs possible gastroparesis  ho appendectomy/ splenectomy    PLAN  no smoking/drinking hx; no family hx cancer  per pt unable to tolerate barium swallow in ED; no record of it?  GI following, EGD scheduled in AM  NPO; SLP eval  maintenance fluids   f/u a1c  dvt ppx  gi ppx  activity AAT  dispo from home; acute    #other problems    DM/ HTN/ HLD/ HF?  -on metformin at home 500mg bid  -start sliding scale, add lantus/lispro if sugars >200  -pt euvolemic on exam  -continue with home meds; valsartan, hctz, furosemide  >>pt cannot tolerate PO meds; consider IV bp control if pt's bp is elevated/and or symptomatic

## 2022-07-11 NOTE — H&P ADULT - NSHPLABSRESULTS_GEN_ALL_CORE
LABS: Personally reviewed labs, imaging, and ECG                          14.4   7.61  )-----------( 300      ( 10 Jul 2022 19:10 )             43.9       07-10    145  |  104  |  16  ----------------------------<  127<H>  3.9   |  26  |  0.9    Ca    9.6      10 Jul 2022 19:10  Mg     2.0     07-10    TPro  6.9  /  Alb  4.4  /  TBili  0.4  /  DBili  x   /  AST  28  /  ALT  38  /  AlkPhos  100  07-10       LIVER FUNCTIONS - ( 10 Jul 2022 19:10 )  Alb: 4.4 g/dL / Pro: 6.9 g/dL / ALK PHOS: 100 U/L / ALT: 38 U/L / AST: 28 U/L / GGT: x                        PT/INR - ( 10 Jul 2022 21:49 )   PT: 12.10 sec;   INR: 1.05 ratio         PTT - ( 10 Jul 2022 21:49 )  PTT:28.9 sec    Lactate Trend      CARDIAC MARKERS ( 10 Jul 2022 19:10 )  x     / <0.01 ng/mL / x     / x     / x            CAPILLARY BLOOD GLUCOSE            RADIOLOGY & ADDITIONAL TESTS:

## 2022-07-12 LAB
ALBUMIN SERPL ELPH-MCNC: 3.7 G/DL — SIGNIFICANT CHANGE UP (ref 3.5–5.2)
ALP SERPL-CCNC: 84 U/L — SIGNIFICANT CHANGE UP (ref 30–115)
ALT FLD-CCNC: 33 U/L — SIGNIFICANT CHANGE UP (ref 0–41)
ANION GAP SERPL CALC-SCNC: 11 MMOL/L — SIGNIFICANT CHANGE UP (ref 7–14)
AST SERPL-CCNC: 23 U/L — SIGNIFICANT CHANGE UP (ref 0–41)
BASOPHILS # BLD AUTO: 0.07 K/UL — SIGNIFICANT CHANGE UP (ref 0–0.2)
BASOPHILS NFR BLD AUTO: 0.9 % — SIGNIFICANT CHANGE UP (ref 0–1)
BILIRUB SERPL-MCNC: 0.4 MG/DL — SIGNIFICANT CHANGE UP (ref 0.2–1.2)
BLD GP AB SCN SERPL QL: SIGNIFICANT CHANGE UP
BUN SERPL-MCNC: 10 MG/DL — SIGNIFICANT CHANGE UP (ref 10–20)
CALCIUM SERPL-MCNC: 8.7 MG/DL — SIGNIFICANT CHANGE UP (ref 8.5–10.1)
CHLORIDE SERPL-SCNC: 112 MMOL/L — HIGH (ref 98–110)
CO2 SERPL-SCNC: 26 MMOL/L — SIGNIFICANT CHANGE UP (ref 17–32)
CREAT SERPL-MCNC: 0.7 MG/DL — SIGNIFICANT CHANGE UP (ref 0.7–1.5)
EGFR: 90 ML/MIN/1.73M2 — SIGNIFICANT CHANGE UP
EOSINOPHIL # BLD AUTO: 0.22 K/UL — SIGNIFICANT CHANGE UP (ref 0–0.7)
EOSINOPHIL NFR BLD AUTO: 2.9 % — SIGNIFICANT CHANGE UP (ref 0–8)
GLUCOSE BLDC GLUCOMTR-MCNC: 107 MG/DL — HIGH (ref 70–99)
GLUCOSE BLDC GLUCOMTR-MCNC: 107 MG/DL — HIGH (ref 70–99)
GLUCOSE BLDC GLUCOMTR-MCNC: 112 MG/DL — HIGH (ref 70–99)
GLUCOSE BLDC GLUCOMTR-MCNC: 93 MG/DL — SIGNIFICANT CHANGE UP (ref 70–99)
GLUCOSE SERPL-MCNC: 112 MG/DL — HIGH (ref 70–99)
HCT VFR BLD CALC: 38.8 % — SIGNIFICANT CHANGE UP (ref 37–47)
HGB BLD-MCNC: 12.6 G/DL — SIGNIFICANT CHANGE UP (ref 12–16)
IMM GRANULOCYTES NFR BLD AUTO: 0.1 % — SIGNIFICANT CHANGE UP (ref 0.1–0.3)
LYMPHOCYTES # BLD AUTO: 1.96 K/UL — SIGNIFICANT CHANGE UP (ref 1.2–3.4)
LYMPHOCYTES # BLD AUTO: 26.2 % — SIGNIFICANT CHANGE UP (ref 20.5–51.1)
MAGNESIUM SERPL-MCNC: 1.9 MG/DL — SIGNIFICANT CHANGE UP (ref 1.8–2.4)
MCHC RBC-ENTMCNC: 26.9 PG — LOW (ref 27–31)
MCHC RBC-ENTMCNC: 32.5 G/DL — SIGNIFICANT CHANGE UP (ref 32–37)
MCV RBC AUTO: 82.7 FL — SIGNIFICANT CHANGE UP (ref 81–99)
MONOCYTES # BLD AUTO: 0.45 K/UL — SIGNIFICANT CHANGE UP (ref 0.1–0.6)
MONOCYTES NFR BLD AUTO: 6 % — SIGNIFICANT CHANGE UP (ref 1.7–9.3)
NEUTROPHILS # BLD AUTO: 4.78 K/UL — SIGNIFICANT CHANGE UP (ref 1.4–6.5)
NEUTROPHILS NFR BLD AUTO: 63.9 % — SIGNIFICANT CHANGE UP (ref 42.2–75.2)
NRBC # BLD: 0 /100 WBCS — SIGNIFICANT CHANGE UP (ref 0–0)
PLATELET # BLD AUTO: 239 K/UL — SIGNIFICANT CHANGE UP (ref 130–400)
POTASSIUM SERPL-MCNC: 4 MMOL/L — SIGNIFICANT CHANGE UP (ref 3.5–5)
POTASSIUM SERPL-SCNC: 4 MMOL/L — SIGNIFICANT CHANGE UP (ref 3.5–5)
PROT SERPL-MCNC: 5.7 G/DL — LOW (ref 6–8)
RBC # BLD: 4.69 M/UL — SIGNIFICANT CHANGE UP (ref 4.2–5.4)
RBC # FLD: 15.8 % — HIGH (ref 11.5–14.5)
SODIUM SERPL-SCNC: 149 MMOL/L — HIGH (ref 135–146)
SURGICAL PATHOLOGY STUDY: SIGNIFICANT CHANGE UP
WBC # BLD: 7.49 K/UL — SIGNIFICANT CHANGE UP (ref 4.8–10.8)
WBC # FLD AUTO: 7.49 K/UL — SIGNIFICANT CHANGE UP (ref 4.8–10.8)

## 2022-07-12 PROCEDURE — 99233 SBSQ HOSP IP/OBS HIGH 50: CPT

## 2022-07-12 PROCEDURE — 99232 SBSQ HOSP IP/OBS MODERATE 35: CPT | Mod: 25

## 2022-07-12 PROCEDURE — 31575 DIAGNOSTIC LARYNGOSCOPY: CPT

## 2022-07-12 PROCEDURE — 70490 CT SOFT TISSUE NECK W/O DYE: CPT | Mod: 26

## 2022-07-12 PROCEDURE — 99222 1ST HOSP IP/OBS MODERATE 55: CPT | Mod: 25

## 2022-07-12 RX ORDER — ASPIRIN/CALCIUM CARB/MAGNESIUM 324 MG
81 TABLET ORAL DAILY
Refills: 0 | Status: DISCONTINUED | OUTPATIENT
Start: 2022-07-12 | End: 2022-07-13

## 2022-07-12 RX ORDER — PANTOPRAZOLE SODIUM 20 MG/1
40 TABLET, DELAYED RELEASE ORAL
Refills: 0 | Status: DISCONTINUED | OUTPATIENT
Start: 2022-07-12 | End: 2022-07-13

## 2022-07-12 RX ADMIN — ATORVASTATIN CALCIUM 10 MILLIGRAM(S): 80 TABLET, FILM COATED ORAL at 22:34

## 2022-07-12 RX ADMIN — Medication 325 MILLIGRAM(S): at 12:46

## 2022-07-12 RX ADMIN — ENOXAPARIN SODIUM 40 MILLIGRAM(S): 100 INJECTION SUBCUTANEOUS at 00:48

## 2022-07-12 NOTE — PROGRESS NOTE ADULT - ASSESSMENT
Assessment	  77 year old female with history of DM, HLD, HTN pw with worsening esophageal dysphagia initially with solids now worse with liquids, denies pain, but endorses feeling of food getting stuck and choking feeling with liquids along with regurgitation of some solids. Can tolerate some PO intake; no liquids. Denies spasms of esophagus. Going for EGD in AM. No fam hx of esophageal cancer, no ho radiation or previous esophageal ca.         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

## 2022-07-12 NOTE — SWALLOW BEDSIDE ASSESSMENT ADULT - SWALLOW EVAL: PROGNOSIS
pt and spouse report sporadic difficulty when eating certain foods characterized by globus sensation and vomiting.

## 2022-07-12 NOTE — PROGRESS NOTE ADULT - SUBJECTIVE AND OBJECTIVE BOX
History   77 year old female with history of DM, HTN, HLD  presents with worsening esophageal dysphagia over the last 4 weeks. Pt states her symptoms started with regurgitation of solids initially now progressed to liquids. She came in to the ED because she had an acute episode of choking after drinking water. She states she can't keep any liquids down and has the constant fear that she will choke. She can keep some solids down depending on the texture. She denies any pain while swallowing, denies N/V/D, hematemesis, constipation, chest pain, shortness of breath, no family hx of esophageal cancers. no smoking, no drinking, no drug use. She follows Dr. Brink PCP o/p. She has not had any formal diagnostic work up for this issue.    Overnight events  None significant    Subjective  Patient was comfortable on bed. Mentioned that she wanted to eat something.      T(F): 97.4 (07-12-22 @ 04:47), Max: 98.3 (07-11-22 @ 21:17)  HR: 68 (07-12-22 @ 04:47) (67 - 73)  BP: 115/58 (07-12-22 @ 04:47) (113/57 - 155/66)  RR: 18 (07-12-22 @ 04:47) (18 - 26)  SpO2: 96% (07-11-22 @ 15:49) (96% - 97%)        LABS:                      12.6    (    82.7   7.49  )-----------( ---------      239      ( 12 Jul 2022 07:51 )             38.8    (    15.8     149   (   112   (   112      07-12-22 @ 07:51  ----------------------               4.0   (   26   (   10                             -----                        0.7  Ca  8.7   Mg  1.9    P   --     LFT  5.7  (  0.4  (  23       07-12-22 @ 07:51  -------------------------  3.7  (  84  (  33    PT/INR - ( 10 Jul 2022 21:49 )   PT: 12.10 sec;   INR: 1.05 ratio    PTT - ( 10 Jul 2022 21:49 )  PTT: 28.9 sec    CARDIAC MARKERS ( 10 Jul 2022 19:10 )  x     / <0.01 ng/mL / x     / x     / x        CAPILLARY BLOOD GLUCOSE  POCT Blood Glucose.: 107 (07-12-22 @ 12:39)  POCT Blood Glucose.: 107 (07-12-22 @ 07:49)  POCT Blood Glucose.: 108 (07-11-22 @ 21:52)  POCT Blood Glucose.: 131 (07-11-22 @ 17:01)  POCT Blood Glucose.: 106 (07-11-22 @ 08:42)    RADIOLOGY & ADDITIONAL TESTS:  < from: Xray Chest 1 View- PORTABLE-Urgent (Xray Chest 1 View- PORTABLE-Urgent .) (07.10.22 @ 19:55) >  Impression:    No radiographic evidence of acute cardiopulmonary disease.    Low lung volumes leads to magnification of the pulmonary interstitium.    < end of copied text >    MEDICATIONS:    acetaminophen     Tablet .. 650 milliGRAM(s) Oral every 6 hours PRN  aluminum hydroxide/magnesium hydroxide/simethicone Suspension 30 milliLiter(s) Oral every 4 hours PRN  aspirin 325 milliGRAM(s) Oral daily  atorvastatin 10 milliGRAM(s) Oral at bedtime  enoxaparin Injectable 40 milliGRAM(s) SubCutaneous every 24 hours  furosemide    Tablet 20 milliGRAM(s) Oral daily  hydrochlorothiazide 12.5 milliGRAM(s) Oral daily  insulin lispro (ADMELOG) corrective regimen sliding scale   SubCutaneous three times a day before meals  lactated ringers. 1000 milliLiter(s) IV Continuous <Continuous>  melatonin 3 milliGRAM(s) Oral at bedtime PRN  ondansetron Injectable 4 milliGRAM(s) IV Push every 8 hours PRN  ondansetron Injectable 4 milliGRAM(s) IV Push once PRN      Physical exam  Gen: NAD  HEENT: PERRL, EOMI, mouth clr, nose clr; lg tongue  Neck: no nodes, no JVD, thyroid nl  lungs: clr  hrt: s1 s2 rrr no murmur  abd: soft, NT/ND, no HS megaly  ext: no edema, no c/c  neuro: aa ox3, cn intact, can move all 4 ext  pantoprazole    Tablet 40 milliGRAM(s) Oral two times a day  valsartan 80 milliGRAM(s) Oral daily

## 2022-07-12 NOTE — PROGRESS NOTE ADULT - SUBJECTIVE AND OBJECTIVE BOX
ANA DERECK  77y  Female  ***My note supersedes ALL resident notes that I sign.  My corrections for their notes are in my note.***    I can be reached directly on [employer]. My office number is [phone]. My personal cell number is [phone].    INTERVAL EVENTS: Here for f/u of dysphagia. Pt reports that 7 yrs ago, she had some trouble swallowing, but not to extent to where she severely gags on sm sips of water like now. She also had a change in her voice. She went to [facility] (seems like SLP dept) and spoke into a microphone and then was given talking and breathing techniques to slow down her speech and this reduced the strain on her voice and problem resolved.  Pt was great for several years. Over past few months, she is having trouble swallowing sometimes solids, but rarely. It is mostly sips of water. She can actually swallow other liquids (iced tea, coffee, juice, etc.) w/out issue. She also reports that ginger ale (with bubbles) did the same gagging. When she gags, she feels like she cannot breathe at all and that she is going to die. She also notices that her voice starts to sound different (like "dee mouse"). She has not lost any weight. She does not smoke, drink EtOH or use drugs. She has no pain. She otherwise feels fine.    T(F): 97.4 (07-12-22 @ 04:47), Max: 98.3 (07-11-22 @ 21:17)  HR: 68 (07-12-22 @ 04:47) (67 - 73)  BP: 115/58 (07-12-22 @ 04:47) (113/57 - 155/66)  RR: 18 (07-12-22 @ 04:47) (18 - 26)  SpO2: 96% (07-11-22 @ 15:49) (96% - 97%)    Gen: NAD  HEENT: PERRL, EOMI, mouth clr, nose clr; lg tongue  Neck: no nodes, no JVD, thyroid nl  lungs: clr  hrt: s1 s2 rrr no murmur  abd: soft, NT/ND, no HS megaly  ext: no edema, no c/c  neuro: aa ox3, cn intact, can move all 4 ext    LABS:                      12.6    (    82.7   7.49  )-----------( ---------      239      ( 12 Jul 2022 07:51 )             38.8    (    15.8     149   (   112   (   112      07-12-22 @ 07:51  ----------------------               4.0   (   26   (   10                             -----                        0.7  Ca  8.7   Mg  1.9    P   --     LFT  5.7  (  0.4  (  23       07-12-22 @ 07:51  -------------------------  3.7  (  84  (  33    PT/INR - ( 10 Jul 2022 21:49 )   PT: 12.10 sec;   INR: 1.05 ratio    PTT - ( 10 Jul 2022 21:49 )  PTT: 28.9 sec    CARDIAC MARKERS ( 10 Jul 2022 19:10 )  x     / <0.01 ng/mL / x     / x     / x        CAPILLARY BLOOD GLUCOSE  POCT Blood Glucose.: 107 (07-12-22 @ 12:39)  POCT Blood Glucose.: 107 (07-12-22 @ 07:49)  POCT Blood Glucose.: 108 (07-11-22 @ 21:52)  POCT Blood Glucose.: 131 (07-11-22 @ 17:01)  POCT Blood Glucose.: 106 (07-11-22 @ 08:42)    RADIOLOGY & ADDITIONAL TESTS:  < from: Xray Chest 1 View- PORTABLE-Urgent (Xray Chest 1 View- PORTABLE-Urgent .) (07.10.22 @ 19:55) >  Impression:    No radiographic evidence of acute cardiopulmonary disease.    Low lung volumes leads to magnification of the pulmonary interstitium.    < end of copied text >    MEDICATIONS:    acetaminophen     Tablet .. 650 milliGRAM(s) Oral every 6 hours PRN  aluminum hydroxide/magnesium hydroxide/simethicone Suspension 30 milliLiter(s) Oral every 4 hours PRN  aspirin 325 milliGRAM(s) Oral daily  atorvastatin 10 milliGRAM(s) Oral at bedtime  enoxaparin Injectable 40 milliGRAM(s) SubCutaneous every 24 hours  furosemide    Tablet 20 milliGRAM(s) Oral daily  hydrochlorothiazide 12.5 milliGRAM(s) Oral daily  insulin lispro (ADMELOG) corrective regimen sliding scale   SubCutaneous three times a day before meals  lactated ringers. 1000 milliLiter(s) IV Continuous <Continuous>  melatonin 3 milliGRAM(s) Oral at bedtime PRN  ondansetron Injectable 4 milliGRAM(s) IV Push every 8 hours PRN  ondansetron Injectable 4 milliGRAM(s) IV Push once PRN  pantoprazole    Tablet 40 milliGRAM(s) Oral two times a day  valsartan 80 milliGRAM(s) Oral daily

## 2022-07-12 NOTE — CONSULT NOTE ADULT - NS ATTEND AMEND GEN_ALL_CORE FT
Patient seen and examined at bedside.    Endoscopic exam shows no supralaryngeal abnormality.    Recommend CT neck w/o and barium esophagram, as recommended by GI as well.    Will follow.

## 2022-07-12 NOTE — SWALLOW BEDSIDE ASSESSMENT ADULT - SWALLOW EVAL: DIAGNOSIS
PO trials not given 2' pt scheduled for EGD. SLP to f/u
+toleration for regular solids and thin liquids w/o overt s/s aspiration/penetration

## 2022-07-12 NOTE — SWALLOW BEDSIDE ASSESSMENT ADULT - SLP GENERAL OBSERVATIONS
pt received in bed awake alert w/o c/o pain. 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 Pt does not recall hx of FEES or VFSS.

## 2022-07-12 NOTE — SWALLOW BEDSIDE ASSESSMENT ADULT - SLP PERTINENT HISTORY OF CURRENT PROBLEM
pt is a 78 y/o F w/ PMHx: DM, HLD, HTN p/w worsening esophageal dysphagia initially with solids now worse with liquids, denies pain, but endorses feeling of food getting stuck and choking feeling with liquids along with regurgitation of some solids. No family hx of esophageal cancer, no h/o radiation or previous esophageal ca. Pt is being treated for nonacute esophageal dysphagia without odynophagia, r/o malignancy vs esophageal motility disorder vs possible gastroparesis. GI following, pt scheduled for EGD today.
pt is a 76 y/o F w/ PMHx: DM, HTN, HLD p/w worsening esophageal dysphagia over the last 4 weeks. Pt s/p EGD yesterday: no food impaction, biopsies taken of irregularity in the Z-line and gastroesophageal junction, scope passed w/o any resistance per GI report. GI recs to advance diet as tolerated, barium esophagram, and manometry study as O/P. CXR (-)

## 2022-07-12 NOTE — PROGRESS NOTE ADULT - ASSESSMENT
77 year old woman with hx DM (well controlled), HLD, HTN p/w with worsening dysphagia. ho appendectomy/ splenectomy    # dysphagia seems to be more in pharynx/larynx area w/ some occ vocal involvement; now mostly to water only  no pain; no wt loss  no smoking/drinking hx; no family hx cancer  Eosinophils only 2%  EGD: NE gastritis only; f/u bx  GI: s/p EGD; outpt manometry  Rad: would NOT do esophagram (prob not helpful); they prefer manometry first  Sp/Sw: passed for reg + thins (no restriction)  ENT: I spoke w/ PA, asked for laryngoscopy today  Diet: reg + thins  IVFs: d/c  PPI po q12  d/c maalox  zofran prn    # DM - well controlled; no retinopathy, neuropathy, nephropathy  FS usually good  A1c 6.5  on metformin at home  FS 1x/day    # decr asa to 81mg po q24    # HTN  c/w lasix, ARB/HCTZ,     # DLD  statin    # DVT ppx: LMWH    # activity: independent     dispo: f/u ENT; consider d/c today 77 year old woman with hx DM (well controlled), HLD, HTN p/w with worsening dysphagia. ho appendectomy/ splenectomy    # dysphagia seems to be more in pharynx/larynx area w/ some occ vocal involvement; now mostly to water only  no pain; no wt loss  no smoking/drinking hx; no family hx cancer  Eosinophils only 2%  EGD: NE gastritis only; f/u bx  GI: s/p EGD; outpt manometry  Sp/Sw: passed for reg + thins (no restriction)  ENT: 7/12 s/p laryngoscopy - nothing seen  Barium Esoph (wanted by GI and ENT)  order CT Neck non-con:  Diet: reg + thins  IVFs: d/c  PPI po q12  d/c maalox  zofran prn    # DM - well controlled; no retinopathy, neuropathy, nephropathy  FS usually good  A1c 6.5  on metformin at home  FS 1x/day    # decr asa to 81mg po q24    # HTN  c/w lasix, ARB/HCTZ,     # DLD  statin    # DVT ppx: LMWH    # activity: independent     dispo: f/u ENT; Barium Esoph; CT Neck non-con  anticipate d/c tomorrow

## 2022-07-12 NOTE — PROGRESS NOTE ADULT - SUBJECTIVE AND OBJECTIVE BOX
Gastroenterology progress note:     Patient is a 77y old  Female who presents with a chief complaint of      Admitted on: 07-10-22    We are following the patient for dysphagia     Interval History: patient was admitted for acute dysphagia s/p EGD yesterday with biopsies, no food bolus was noted in the esophagus     No acute events overnight.   - Diet - advanced   - last BM - before admission   - Abdominal pain - denies pain       PAST MEDICAL & SURGICAL HISTORY:    Diabetes mellitus  Hypertension  S/P appendectomy  S/P cholecystectomy    MEDICATIONS  (STANDING):  aspirin 325 milliGRAM(s) Oral daily  atorvastatin 10 milliGRAM(s) Oral at bedtime  dextrose 5% + sodium chloride 0.45%. 1000 milliLiter(s) (90 mL/Hr) IV Continuous <Continuous>  dextrose 5%. 1000 milliLiter(s) (50 mL/Hr) IV Continuous <Continuous>  dextrose 5%. 1000 milliLiter(s) (100 mL/Hr) IV Continuous <Continuous>  dextrose 50% Injectable 25 Gram(s) IV Push once  dextrose 50% Injectable 12.5 Gram(s) IV Push once  dextrose 50% Injectable 25 Gram(s) IV Push once  enoxaparin Injectable 40 milliGRAM(s) SubCutaneous every 24 hours  furosemide    Tablet 20 milliGRAM(s) Oral daily  glucagon  Injectable 1 milliGRAM(s) IntraMuscular once  hydrochlorothiazide 12.5 milliGRAM(s) Oral daily  insulin lispro (ADMELOG) corrective regimen sliding scale   SubCutaneous three times a day before meals  lactated ringers. 1000 milliLiter(s) (75 mL/Hr) IV Continuous <Continuous>  pantoprazole  Injectable 40 milliGRAM(s) IV Push daily  valsartan 80 milliGRAM(s) Oral daily    MEDICATIONS  (PRN):  acetaminophen     Tablet .. 650 milliGRAM(s) Oral every 6 hours PRN Temp greater or equal to 38C (100.4F), Mild Pain (1 - 3)  aluminum hydroxide/magnesium hydroxide/simethicone Suspension 30 milliLiter(s) Oral every 4 hours PRN Dyspepsia  dextrose Oral Gel 15 Gram(s) Oral once PRN Blood Glucose LESS THAN 70 milliGRAM(s)/deciliter  melatonin 3 milliGRAM(s) Oral at bedtime PRN Insomnia  ondansetron Injectable 4 milliGRAM(s) IV Push every 8 hours PRN Nausea and/or Vomiting  ondansetron Injectable 4 milliGRAM(s) IV Push once PRN Nausea and/or Vomiting      Allergies  No Known Allergies      Review of Systems:   Cardiovascular:  No Chest Pain, No Palpitations  HENT no headache chaneg  eye no vision change   RUFINO no new weakness   psych no mood change   Respiratory:  No Cough, No Dyspnea  Gastrointestinal:  As described in HPI  Skin:  No Skin Lesions, No Jaundice  Neuro:  No Syncope, No Dizziness    Physical Examination:  T(C): 36.3 (07-12-22 @ 04:47), Max: 36.8 (07-11-22 @ 21:17)  HR: 68 (07-12-22 @ 04:47) (65 - 74)  BP: 115/58 (07-12-22 @ 04:47) (113/57 - 155/66)  RR: 18 (07-12-22 @ 04:47) (18 - 26)  SpO2: 96% (07-11-22 @ 15:49) (96% - 97%)  Weight (kg): 90 (07-11-22 @ 11:27)      GENERAL: AAOx3, no acute distress.  HEAD:  Atraumatic, Normocephalic  EYES: conjunctiva and sclera clear  NECK: Supple, no JVD or thyromegaly  CHEST/LUNG: Clear to auscultation bilaterally; No wheeze, rhonchi, or rales  HEART: Regular rate and rhythm; normal S1, S2, No murmurs.  ABDOMEN: Soft, nontender, nondistended; Bowel sounds present  NEUROLOGY: No asterixis or tremor.   SKIN: Intact, no jaundice     Data:                        12.5   8.11  )-----------( 260      ( 11 Jul 2022 08:31 )             38.0     Hgb trend:  12.5  07-11-22 @ 08:31  14.4  07-10-22 @ 19:10      07-11    145  |  107  |  13  ----------------------------<  111<H>  3.7   |  25  |  0.8    Ca    8.8      11 Jul 2022 08:31  Mg     2.0     07-10    TPro  5.9<L>  /  Alb  4.0  /  TBili  0.5  /  DBili  x   /  AST  20  /  ALT  31  /  AlkPhos  90  07-11    Liver panel trend:  TBili 0.5   /   AST 20   /   ALT 31   /   AlkP 90   /   Tptn 5.9   /   Alb 4.0    /   DBili --      07-11  TBili 0.4   /   AST 28   /   ALT 38   /   AlkP 100   /   Tptn 6.9   /   Alb 4.4    /   DBili --      07-10      PT/INR - ( 10 Jul 2022 21:49 )   PT: 12.10 sec;   INR: 1.05 ratio         PTT - ( 10 Jul 2022 21:49 )  PTT:28.9 sec       Radiology:       Gastroenterology progress note:     Patient is a 77y old  Female who presents with a chief complaint of dysphagia      Admitted on: 07-10-22    We are following the patient for dysphagia     Interval History: patient was admitted for acute dysphagia s/p EGD yesterday with biopsies, no food bolus was noted in the esophagus     No acute events overnight.   - Diet - advanced , passes speech and swallow   - last BM - before admission   - Abdominal pain - denies pain   - ENT evaluation within normal         PAST MEDICAL & SURGICAL HISTORY:    Diabetes mellitus  Hypertension  S/P appendectomy  S/P cholecystectomy    MEDICATIONS  (STANDING):  aspirin 325 milliGRAM(s) Oral daily  atorvastatin 10 milliGRAM(s) Oral at bedtime  dextrose 5% + sodium chloride 0.45%. 1000 milliLiter(s) (90 mL/Hr) IV Continuous <Continuous>  dextrose 5%. 1000 milliLiter(s) (50 mL/Hr) IV Continuous <Continuous>  dextrose 5%. 1000 milliLiter(s) (100 mL/Hr) IV Continuous <Continuous>  dextrose 50% Injectable 25 Gram(s) IV Push once  dextrose 50% Injectable 12.5 Gram(s) IV Push once  dextrose 50% Injectable 25 Gram(s) IV Push once  enoxaparin Injectable 40 milliGRAM(s) SubCutaneous every 24 hours  furosemide    Tablet 20 milliGRAM(s) Oral daily  glucagon  Injectable 1 milliGRAM(s) IntraMuscular once  hydrochlorothiazide 12.5 milliGRAM(s) Oral daily  insulin lispro (ADMELOG) corrective regimen sliding scale   SubCutaneous three times a day before meals  lactated ringers. 1000 milliLiter(s) (75 mL/Hr) IV Continuous <Continuous>  pantoprazole  Injectable 40 milliGRAM(s) IV Push daily  valsartan 80 milliGRAM(s) Oral daily    MEDICATIONS  (PRN):  acetaminophen     Tablet .. 650 milliGRAM(s) Oral every 6 hours PRN Temp greater or equal to 38C (100.4F), Mild Pain (1 - 3)  aluminum hydroxide/magnesium hydroxide/simethicone Suspension 30 milliLiter(s) Oral every 4 hours PRN Dyspepsia  dextrose Oral Gel 15 Gram(s) Oral once PRN Blood Glucose LESS THAN 70 milliGRAM(s)/deciliter  melatonin 3 milliGRAM(s) Oral at bedtime PRN Insomnia  ondansetron Injectable 4 milliGRAM(s) IV Push every 8 hours PRN Nausea and/or Vomiting  ondansetron Injectable 4 milliGRAM(s) IV Push once PRN Nausea and/or Vomiting      Allergies  No Known Allergies      Review of Systems:   Cardiovascular:  No Chest Pain, No Palpitations  HENT no headache   eye no vision change   RUFINO no new weakness   psych no mood change   Respiratory:  No Cough, No Dyspnea  Gastrointestinal:  As described in HPI  Skin:  No Skin Lesions, No Jaundice  Neuro:  No Syncope, No Dizziness    Physical Examination:  T(C): 36.3 (07-12-22 @ 04:47), Max: 36.8 (07-11-22 @ 21:17)  HR: 68 (07-12-22 @ 04:47) (65 - 74)  BP: 115/58 (07-12-22 @ 04:47) (113/57 - 155/66)  RR: 18 (07-12-22 @ 04:47) (18 - 26)  SpO2: 96% (07-11-22 @ 15:49) (96% - 97%)  Weight (kg): 90 (07-11-22 @ 11:27)      GENERAL: AAOx3, no acute distress.  HEAD:  Atraumatic, Normocephalic  EYES: conjunctiva and sclera clear  NECK: Supple, no JVD or thyromegaly  CHEST/LUNG: Clear to auscultation bilaterally; No wheeze, rhonchi, or rales  HEART: Regular rate and rhythm; normal S1, S2, No murmurs.  ABDOMEN: Soft, nontender, nondistended; Bowel sounds present  NEUROLOGY: No asterixis or tremor.   SKIN: Intact, no jaundice     Data:                        12.5   8.11  )-----------( 260      ( 11 Jul 2022 08:31 )             38.0     Hgb trend:  12.5  07-11-22 @ 08:31  14.4  07-10-22 @ 19:10      07-11    145  |  107  |  13  ----------------------------<  111<H>  3.7   |  25  |  0.8    Ca    8.8      11 Jul 2022 08:31  Mg     2.0     07-10    TPro  5.9<L>  /  Alb  4.0  /  TBili  0.5  /  DBili  x   /  AST  20  /  ALT  31  /  AlkPhos  90  07-11    Liver panel trend:  TBili 0.5   /   AST 20   /   ALT 31   /   AlkP 90   /   Tptn 5.9   /   Alb 4.0    /   DBili --      07-11  TBili 0.4   /   AST 28   /   ALT 38   /   AlkP 100   /   Tptn 6.9   /   Alb 4.4    /   DBili --      07-10      PT/INR - ( 10 Jul 2022 21:49 )   PT: 12.10 sec;   INR: 1.05 ratio         PTT - ( 10 Jul 2022 21:49 )  PTT:28.9 sec       Surgical Pathology Report:   ACCESSION No:  [id number]  Patient:   DERECK PEREZ      Accession:                             [id number]    Collected Date/Time:                   7/11/2022 12:10 EDT  Received Date/Time:                    7/11/2022 13:24 EDT    Surgical Pathology Report - Auth (Verified)    Specimen(s) Submitted  1  Antrum  2  Body  3  GE Junction  4  Mid esophagus  5  Distal esophagus    Final Diagnosis    1. Antrum, biopsy:  -  Antrum and body type mucosa showing mild chronic nonspecific  inflammation.  -  Giemsa stain for Helicobacter is negative.    2.  Body, biopsy:  -  Body type mucosa showing changes suggestive of proton pump inhibitor  effect.    3.  GE junction, biopsy:  -  Squamous mucosa showing ulceration with acute inflammatory exudate.    4.5.  Mid and distal esophagus, biopsy:  -  Squamous mucosa showing scattered intraepithelial eosinophils and few  superficial neutrophils suggestive of reflux.  Verified by: Breana Messina M.D.  (Electronic Signature)  Reported on: 07/12/22 13:41 EDT, [place] [address]  Phone: [phone]   Fax: [phone]  _________________________________________________________________      Clinical Information  EGD  Food impaction    Perioperative Diagnosis  Food impaction    Postoperative Diagnosis  Gastritis, R/O EOE      Gross Description  1.  Received in formalin labeled "antrum".  The specimen consists of 2  fragments of soft tan-pink tissue measuring 0.4 cm each.  The specimen is  entirely submitted.  (1 block)    2.  Received in formalin labeled "body".  The specimen consists of 2  fragments of soft tan-pink tissue measuring 0.2 cm and 0.4 cm.  The  specimen is entirely submitted.  (1 block)    3.  Received in formalin labeled "GE junction".  The specimen consists of  5 fragments of soft tan-pink tissue measuring from less than 0.1 cm - 0.4  cm.  The smallest fragments may not survive processing.  The specimen is  entirely submitted.  (1 block)    4.  Received informalin labeled "mid esophagus".  The specimen consists  of 2 fragments of soft tan-pink tissue measuring less than 0.1 cm and 0.4  cm.  The specimen is entirely submitted.  (1 block)    5.  Received in formalin labeled "distal esophagus".  The specimen  consists of 2 fragments of soft tan-pink tissue measuring 0.2 cm and 0.5  cm.  The specimen is entirely submitted.  (1 block)    Specimen was received and underwent gross examination at [facility], [address] [place] [id number].    07/11/2022 13:41:38 EDT   DENEEN (07.11.22 @ 12:10)

## 2022-07-12 NOTE — CONSULT NOTE ADULT - ASSESSMENT
Pt is a 77y F with hx of DM, HTN a/w dysphagia; ENT called to evaluate for dysphagia to liquids.     ·	Pt seen and examined at bedside with Dr. Bronson.  ·	FFL showed no masses or lesions noted to NP/OP/HP. Laryngeal structures intact, no edema or erythema noted. Epiglottis crisp, no edema. TVC/FVC mobile.   ·	Recommend CT Neck non-con   ·	Recommend Barium Esophagram   ·	Case was d/w Dr. Stearns and medical team   
77 year old female with history of DM, HTN, HLD presents with worsening esophageal dysphagia over the last 4 weeks. Today patient was not able to drink liquids and intermittently spitting her saliva       #Dysphagia to solids and liquids :   r/o food impaction     REC:  Keep NPO  EGD       #HCM: Follow up in [facility] next available   await pathology results   [facility] clinic is at [address] [place] [address] . [place] [address] . Phone number  [phone]

## 2022-07-12 NOTE — CONSULT NOTE ADULT - SUBJECTIVE AND OBJECTIVE BOX
Gastroenterology Consultation:    Patient is a 77y old  Female who presents with a chief complaint of     Admitted on: 07-10-22  HPI:  77 year old female with history of DM, HTN, HLD  presents with worsening esophageal dysphagia over the last 4 weeks. Pt states her symptoms started with regurgitation of solids initially now progressed to liquids. She came in to the ED because she had an acute episode of choking after drinking water. She states she can't keep any liquids down and has the constant fear that she will choke. She can keep some solids down depending on the texture. She denies any pain while swallowing, denies N/V/D, hematemesis, constipation, chest pain, shortness of breath, no family hx of esophageal cancers. no smoking, no drinking, no drug use. She follows Dr. Brink PCP o/p. She has not had any formal diagnostic work up for this issue.    in the ED,pt's VS T(C): 37 (07-10-22 @ 23:00), Max: 37 (07-10-22 @ 23:00)  HR: 63 (07-10-22 @ 23:00) (63 - 74)  BP: 140/61 (07-10-22 @ 23:00) (140/61 - 153/75)  RR: 18 (07-10-22 @ 23:00) (16 - 18)  SpO2: 97% (07-10-22 @ 23:00) (97% - 97%), labs done showed wbc 7.61, Hb 14.4, INR 1.05, Na 145, K 3.9, Cr 0.9, BUN 16, trop negative, Mg 2  EKG: NSR, CXR: possible congestion, low lung volume L (pending official read)  pt received LR 1L bolus, 4mg glucagon  pt is admitted for workup/management esophageal dysphagia worsening with liquids (11 Jul 2022 00:15)            PAST MEDICAL & SURGICAL HISTORY:  Diabetes mellitus      Hypertension      S/P appendectomy      S/P cholecystectomy          FAMILY HISTORY:  No pertinent family history in first degree relatives        Social History:  Alcohol: denies   Drugs: denies     Home Medications:  aspirin 81 mg oral tablet: 1 tab(s) orally once a day (11 Jul 2022 03:13)  atorvastatin 10 mg oral tablet: 1 tab(s) orally once a day (11 Jul 2022 03:13)  Fish Oil 1000 mg oral capsule: 1 cap(s) orally once a day (11 Jul 2022 03:13)  Lasix 20 mg oral tablet: 1 tab(s) orally once a day (11 Jul 2022 03:13)  metFORMIN 500 mg oral tablet: 1 tab(s) orally 2 times a day (11 Jul 2022 03:13)  valsartan-hydrochlorothiazide 80 mg-12.5 mg oral tablet: 1 tab(s) orally once a day (11 Jul 2022 03:13)    MEDICATIONS  (STANDING):  aspirin 325 milliGRAM(s) Oral daily  atorvastatin 10 milliGRAM(s) Oral at bedtime  dextrose 5% + sodium chloride 0.45%. 1000 milliLiter(s) (90 mL/Hr) IV Continuous <Continuous>  dextrose 5%. 1000 milliLiter(s) (50 mL/Hr) IV Continuous <Continuous>  dextrose 5%. 1000 milliLiter(s) (100 mL/Hr) IV Continuous <Continuous>  dextrose 50% Injectable 25 Gram(s) IV Push once  dextrose 50% Injectable 12.5 Gram(s) IV Push once  dextrose 50% Injectable 25 Gram(s) IV Push once  enoxaparin Injectable 40 milliGRAM(s) SubCutaneous every 24 hours  furosemide    Tablet 20 milliGRAM(s) Oral daily  glucagon  Injectable 1 milliGRAM(s) IntraMuscular once  hydrochlorothiazide 12.5 milliGRAM(s) Oral daily  insulin lispro (ADMELOG) corrective regimen sliding scale   SubCutaneous three times a day before meals  lactated ringers. 1000 milliLiter(s) (75 mL/Hr) IV Continuous <Continuous>  pantoprazole  Injectable 40 milliGRAM(s) IV Push daily  valsartan 80 milliGRAM(s) Oral daily    MEDICATIONS  (PRN):  acetaminophen     Tablet .. 650 milliGRAM(s) Oral every 6 hours PRN Temp greater or equal to 38C (100.4F), Mild Pain (1 - 3)  aluminum hydroxide/magnesium hydroxide/simethicone Suspension 30 milliLiter(s) Oral every 4 hours PRN Dyspepsia  dextrose Oral Gel 15 Gram(s) Oral once PRN Blood Glucose LESS THAN 70 milliGRAM(s)/deciliter  melatonin 3 milliGRAM(s) Oral at bedtime PRN Insomnia  ondansetron Injectable 4 milliGRAM(s) IV Push every 8 hours PRN Nausea and/or Vomiting  ondansetron Injectable 4 milliGRAM(s) IV Push once PRN Nausea and/or Vomiting      Allergies  No Known Allergies      Review of Systems:   Constitutional:  No Fever, No Chills  ENT/Mouth:  No Hearing Changes,  +Difficulty Swallowing  Eyes:  No Eye Pain, No Vision Changes  Cardiovascular:  No Chest Pain, No Palpitations  Respiratory:  No Cough, No Dyspnea  Gastrointestinal:  As described in HPI  Musculoskeletal:  No Joint Swelling, No Back Pain  Skin:  No Skin Lesions, No Jaundice  Neuro:  No Syncope, No Dizziness  Heme/Lymph:  No Bruising, No Bleeding.          Physical Examination:  T(C): 36.1 (07-11-22 @ 12:32), Max: 37 (07-10-22 @ 23:00)  HR: 70 (07-11-22 @ 15:49) (63 - 74)  BP: 113/57 (07-11-22 @ 15:49) (113/57 - 143/67)  RR: 20 (07-11-22 @ 15:49) (18 - 26)  SpO2: 96% (07-11-22 @ 15:49) (96% - 97%)    Weight (kg): 90 (07-11-22 @ 11:27)      Constitutional: No acute distress.  Eyes:. Conjunctivae are clear, Sclera is non-icteric.  Ears Nose and Throat: The external ears are normal appearing,  Oral mucosa is pink and moist.  Respiratory:  No signs of respiratory distress. Lung sounds are clear bilaterally.  Cardiovascular:  S1 S2, Regular rate and rhythm.  GI: Abdomen is soft, symmetric, and non-tender without distention.  Bowel sounds are present and normoactive in all four quadrants. No masses, hepatomegaly, or splenomegaly are noted.   Neuro: No Tremor, No involuntary movements  Skin: No rashes, No Jaundice.          Data: (reviewed by attending)                        12.5   8.11  )-----------( 260      ( 11 Jul 2022 08:31 )             38.0     Hgb Trend:  12.5  07-11-22 @ 08:31  14.4  07-10-22 @ 19:10      07-11    145  |  107  |  13  ----------------------------<  111<H>  3.7   |  25  |  0.8    Ca    8.8      11 Jul 2022 08:31  Mg     2.0     07-10    TPro  5.9<L>  /  Alb  4.0  /  TBili  0.5  /  DBili  x   /  AST  20  /  ALT  31  /  AlkPhos  90  07-11    Liver panel trend:  TBili 0.5   /   AST 20   /   ALT 31   /   AlkP 90   /   Tptn 5.9   /   Alb 4.0    /   DBili --      07-11  TBili 0.4   /   AST 28   /   ALT 38   /   AlkP 100   /   Tptn 6.9   /   Alb 4.4    /   DBili --      07-10      PT/INR - ( 10 Jul 2022 21:49 )   PT: 12.10 sec;   INR: 1.05 ratio         PTT - ( 10 Jul 2022 21:49 )  PTT:28.9 sec        Radiology:(reviewed by attending)      
ENT CONSULT:  Pt is a 77y F with hx of DM, HTN a/w dysphagia; ENT called to evaluate. Pt seen and examined at bedside with Dr. Bronson. Pt reports dysphagia to liquids for the past couple of months. States she primarily notes the dysphagia to water, and feels like the water gets stuck in her throat. Reports she is able to tolerate solids. Pt had EGD w/ biopsies by GI yesterday. Denies difficulty breathing, fevers/chills.     PAST MEDICAL & SURGICAL HISTORY:  Diabetes mellitus  Hypertension  S/P appendectomy  S/P cholecystectomy    MEDICATIONS  (STANDING):  aspirin  chewable 81 milliGRAM(s) Oral daily  atorvastatin 10 milliGRAM(s) Oral at bedtime  dextrose 5%. 1000 milliLiter(s) (50 mL/Hr) IV Continuous <Continuous>  dextrose 5%. 1000 milliLiter(s) (100 mL/Hr) IV Continuous <Continuous>  dextrose 50% Injectable 25 Gram(s) IV Push once  dextrose 50% Injectable 12.5 Gram(s) IV Push once  dextrose 50% Injectable 25 Gram(s) IV Push once  enoxaparin Injectable 40 milliGRAM(s) SubCutaneous every 24 hours  furosemide    Tablet 20 milliGRAM(s) Oral daily  glucagon  Injectable 1 milliGRAM(s) IntraMuscular once  hydrochlorothiazide 12.5 milliGRAM(s) Oral daily  insulin lispro (ADMELOG) corrective regimen sliding scale   SubCutaneous three times a day before meals  pantoprazole    Tablet 40 milliGRAM(s) Oral two times a day  valsartan 80 milliGRAM(s) Oral daily    MEDICATIONS  (PRN):  acetaminophen     Tablet .. 650 milliGRAM(s) Oral every 6 hours PRN Temp greater or equal to 38C (100.4F), Mild Pain (1 - 3)  dextrose Oral Gel 15 Gram(s) Oral once PRN Blood Glucose LESS THAN 70 milliGRAM(s)/deciliter  melatonin 3 milliGRAM(s) Oral at bedtime PRN Insomnia  ondansetron Injectable 4 milliGRAM(s) IV Push every 8 hours PRN Nausea and/or Vomiting    Allergies  No Known Allergies    SOCIAL HISTORY:    FAMILY HISTORY:  No pertinent family history in first degree relatives    Review of Systems:  [X] A ten point review of systems was otherwise negative except as noted.    Vital Signs Last 24 Hrs  T(C): 36.4 (12 Jul 2022 14:12), Max: 36.8 (11 Jul 2022 21:17)  T(F): 97.6 (12 Jul 2022 14:12), Max: 98.3 (11 Jul 2022 21:17)  HR: 63 (12 Jul 2022 14:12) (63 - 73)  BP: 116/58 (12 Jul 2022 14:12) (113/57 - 155/66)  RR: 18 (12 Jul 2022 14:12) (18 - 20)  SpO2: 96% (11 Jul 2022 15:49) (96% - 96%)    Parameters below as of 11 Jul 2022 15:49  Patient On (Oxygen Delivery Method): room air      GEN: NAD No drooling or pooling of secretions. No stridor. Good vocal quality, no hoarseness.  NEURO: Awake and alert    SKIN: Good color, non diaphoretic  HEENT: NC/AT; Oral mucosa pink and moist. No erythema or edema noted to buccal mucosa, tongue, FOM, uvula or posterior OP. Uvula midline.   RESP: Non-labored breathing.   CARDIO: +S1/S2  ABDO: Soft, NT  EXT: OLIVAS x 4    Fiberoptic Laryngoscopy: No masses or lesions noted to NP/OP/HP. Laryngeal structures intact, no edema or erythema noted. Epiglottis crisp, no edema. TVC/FVC mobile.     LABS:                        12.6   7.49  )-----------( 239      ( 12 Jul 2022 07:51 )             38.8     07-12    149<H>  |  112<H>  |  10  ----------------------------<  112<H>  4.0   |  26  |  0.7    Ca    8.7      12 Jul 2022 07:51  Mg     1.9     07-12    TPro  5.7<L>  /  Alb  3.7  /  TBili  0.4  /  DBili  x   /  AST  23  /  ALT  33  /  AlkPhos  84  07-12    PT/INR - ( 10 Jul 2022 21:49 )   PT: 12.10 sec;   INR: 1.05 ratio    PTT - ( 10 Jul 2022 21:49 )  PTT:28.9 sec

## 2022-07-12 NOTE — PROGRESS NOTE ADULT - ASSESSMENT
77 year old female with history of DM, HTN, HLD presents with worsening esophageal dysphagia over the last 4 weeks. Today patient was not able to drink liquids and intermittently spitting her saliva     #Dysphagia to solids and liquids s/p EGD no food impaction, biopsies obtained   - speech and swallow eval   - advance diet as tolerated   - F/u with GI clinic       #HCM: Follow up in [facility] clinic next available   await pathology results   [facility] clinic is at [address] [place] [address] . [place] [address] . Phone number  [phone] 77 year old female with history of DM, HTN, HLD presents with worsening esophageal dysphagia over the last 4 weeks. Today patient was not able to drink liquids and intermittently spitting her saliva     #Dysphagia to solids and liquids s/p EGD no food impaction, biopsies obtained   - speech and swallow eval   - advance diet as tolerated   - esophageal barium study   - F/u with GI clinic       #HCM: Follow up in [facility] clinic next available   await pathology results   [facility] clinic is at [address] [place] [address] . [place] [address] . Phone number  [phone] 77 year old female with history of DM, HTN, HLD presents with worsening esophageal dysphagia over the last 4 weeks. Today patient was not able to drink liquids and intermittently spitting her saliva     #Dysphagia to solids and liquids s/p EGD no food impaction, biopsies obtained   - speech and swallow eval   - advance diet as tolerated   - barium esophagogram   - F/u with GI clinic       #HCM: Follow up in [facility] clinic next available   await pathology results   [facility] clinic is at [address] [place] [address] . [place] [address] . Phone number  [phone] 77 year old female with history of DM, HTN, HLD presents with worsening esophageal dysphagia over the last 4 weeks. Today patient was not able to drink liquids and intermittently spitting her saliva     #Dysphagia to solids and liquids s/p EGD no food impaction, biopsies obtained   - speech and swallow eval   - advance diet as tolerated   - barium esophagogram and manometry as outpatient  - F/u with GI clinic       #HCM: Follow up in [facility] clinic next available   await pathology results   [facility] clinic is at [address] [place] [address] . [place] [address] . Phone number  [phone] 77 year old female with history of DM, HTN, HLD presents with worsening esophageal dysphagia over the last 4 weeks. Today patient was not able to drink liquids and intermittently spitting her saliva     #Dysphagia to solids and liquids ( mainly)  +/- globus sensation:  s/p EGD no food impaction seen, ulceration and erythematous mucosa noted at 4 cm above GEJ,  but no definite narrowing or stricture noted, multiple biopsies taken from this area, lower, and mid esophagus   - pathology showing Squamous mucosa showing scattered intraepithelial eosinophils and few superficial neutrophils suggestive of reflux.  - passed speech and swallow eval   - ENT evaluation within normal   CT neck non contrast: within normal      REC:   - advance diet as tolerated   - barium esophagogram , if patient to be discharged, study can be done as outpatient   - Esophageal manometry as outpatient  - will double check with pathologist regarding Eosinophilic count to rule out EOE   - Pantoprazole 40 mg po bid for 4 weeks then 40 mg po daily   - F/u with GI  [facility] clinic     #HCM: Follow up in [facility] clinic next available [facility] is at [address] [place] [address] . [place] [address] . Phone number  [phone]

## 2022-07-13 ENCOUNTER — TRANSCRIPTION ENCOUNTER (OUTPATIENT)
Age: 77
End: 2022-07-13

## 2022-07-13 VITALS
SYSTOLIC BLOOD PRESSURE: 132 MMHG | HEART RATE: 65 BPM | TEMPERATURE: 96 F | DIASTOLIC BLOOD PRESSURE: 59 MMHG | RESPIRATION RATE: 18 BRPM

## 2022-07-13 LAB
ANION GAP SERPL CALC-SCNC: 11 MMOL/L — SIGNIFICANT CHANGE UP (ref 7–14)
BASOPHILS # BLD AUTO: 0.06 K/UL — SIGNIFICANT CHANGE UP (ref 0–0.2)
BASOPHILS NFR BLD AUTO: 1.1 % — HIGH (ref 0–1)
BUN SERPL-MCNC: 10 MG/DL — SIGNIFICANT CHANGE UP (ref 10–20)
CALCIUM SERPL-MCNC: 8.6 MG/DL — SIGNIFICANT CHANGE UP (ref 8.5–10.1)
CHLORIDE SERPL-SCNC: 105 MMOL/L — SIGNIFICANT CHANGE UP (ref 98–110)
CO2 SERPL-SCNC: 25 MMOL/L — SIGNIFICANT CHANGE UP (ref 17–32)
CREAT SERPL-MCNC: 0.7 MG/DL — SIGNIFICANT CHANGE UP (ref 0.7–1.5)
EGFR: 90 ML/MIN/1.73M2 — SIGNIFICANT CHANGE UP
EOSINOPHIL # BLD AUTO: 0.25 K/UL — SIGNIFICANT CHANGE UP (ref 0–0.7)
EOSINOPHIL NFR BLD AUTO: 4.4 % — SIGNIFICANT CHANGE UP (ref 0–8)
GLUCOSE BLDC GLUCOMTR-MCNC: 111 MG/DL — HIGH (ref 70–99)
GLUCOSE BLDC GLUCOMTR-MCNC: 117 MG/DL — HIGH (ref 70–99)
GLUCOSE BLDC GLUCOMTR-MCNC: 99 MG/DL — SIGNIFICANT CHANGE UP (ref 70–99)
GLUCOSE SERPL-MCNC: 115 MG/DL — HIGH (ref 70–99)
HCT VFR BLD CALC: 37.7 % — SIGNIFICANT CHANGE UP (ref 37–47)
HGB BLD-MCNC: 12.2 G/DL — SIGNIFICANT CHANGE UP (ref 12–16)
IMM GRANULOCYTES NFR BLD AUTO: 0.2 % — SIGNIFICANT CHANGE UP (ref 0.1–0.3)
LYMPHOCYTES # BLD AUTO: 1.72 K/UL — SIGNIFICANT CHANGE UP (ref 1.2–3.4)
LYMPHOCYTES # BLD AUTO: 30.4 % — SIGNIFICANT CHANGE UP (ref 20.5–51.1)
MAGNESIUM SERPL-MCNC: 1.9 MG/DL — SIGNIFICANT CHANGE UP (ref 1.8–2.4)
MCHC RBC-ENTMCNC: 26.5 PG — LOW (ref 27–31)
MCHC RBC-ENTMCNC: 32.4 G/DL — SIGNIFICANT CHANGE UP (ref 32–37)
MCV RBC AUTO: 81.8 FL — SIGNIFICANT CHANGE UP (ref 81–99)
MONOCYTES # BLD AUTO: 0.37 K/UL — SIGNIFICANT CHANGE UP (ref 0.1–0.6)
MONOCYTES NFR BLD AUTO: 6.5 % — SIGNIFICANT CHANGE UP (ref 1.7–9.3)
NEUTROPHILS # BLD AUTO: 3.25 K/UL — SIGNIFICANT CHANGE UP (ref 1.4–6.5)
NEUTROPHILS NFR BLD AUTO: 57.4 % — SIGNIFICANT CHANGE UP (ref 42.2–75.2)
NRBC # BLD: 0 /100 WBCS — SIGNIFICANT CHANGE UP (ref 0–0)
PLATELET # BLD AUTO: 246 K/UL — SIGNIFICANT CHANGE UP (ref 130–400)
POTASSIUM SERPL-MCNC: 3.7 MMOL/L — SIGNIFICANT CHANGE UP (ref 3.5–5)
POTASSIUM SERPL-SCNC: 3.7 MMOL/L — SIGNIFICANT CHANGE UP (ref 3.5–5)
RBC # BLD: 4.61 M/UL — SIGNIFICANT CHANGE UP (ref 4.2–5.4)
RBC # FLD: 15.4 % — HIGH (ref 11.5–14.5)
SODIUM SERPL-SCNC: 141 MMOL/L — SIGNIFICANT CHANGE UP (ref 135–146)
WBC # BLD: 5.66 K/UL — SIGNIFICANT CHANGE UP (ref 4.8–10.8)
WBC # FLD AUTO: 5.66 K/UL — SIGNIFICANT CHANGE UP (ref 4.8–10.8)

## 2022-07-13 PROCEDURE — 99232 SBSQ HOSP IP/OBS MODERATE 35: CPT

## 2022-07-13 PROCEDURE — 74220 X-RAY XM ESOPHAGUS 1CNTRST: CPT | Mod: 26

## 2022-07-13 PROCEDURE — 99239 HOSP IP/OBS DSCHRG MGMT >30: CPT

## 2022-07-13 RX ORDER — ONDANSETRON 8 MG/1
1 TABLET, FILM COATED ORAL
Qty: 14 | Refills: 0
Start: 2022-07-13 | End: 2022-07-26

## 2022-07-13 RX ORDER — PANTOPRAZOLE SODIUM 20 MG/1
1 TABLET, DELAYED RELEASE ORAL
Qty: 56 | Refills: 0
Start: 2022-07-13 | End: 2022-08-09

## 2022-07-13 RX ADMIN — VALSARTAN 80 MILLIGRAM(S): 80 TABLET ORAL at 06:15

## 2022-07-13 RX ADMIN — Medication 20 MILLIGRAM(S): at 06:15

## 2022-07-13 RX ADMIN — Medication 81 MILLIGRAM(S): at 13:35

## 2022-07-13 RX ADMIN — PANTOPRAZOLE SODIUM 40 MILLIGRAM(S): 20 TABLET, DELAYED RELEASE ORAL at 06:15

## 2022-07-13 RX ADMIN — ENOXAPARIN SODIUM 40 MILLIGRAM(S): 100 INJECTION SUBCUTANEOUS at 00:22

## 2022-07-13 RX ADMIN — Medication 12.5 MILLIGRAM(S): at 06:15

## 2022-07-13 NOTE — PROGRESS NOTE ADULT - ASSESSMENT
77 year old woman with hx DM (well controlled), HLD, HTN p/w with worsening dysphagia. ho appendectomy/ splenectomy    # dysphagia seems to be more in pharynx/larynx area w/ some occ vocal involvement; now mostly to water only  no pain; no wt loss  no smoking/drinking hx; no family hx cancer  Eosinophils only 2%  EGD: NE gastritis only; f/u bx  GI: s/p EGD; outpt manometry  Sp/Sw: passed for reg + thins (no restriction)  ENT: 7/12 s/p laryngoscopy - nothing seen  Barium Esoph: sm HH; min tert contract (nl for age)  CT Neck non-con: totally nl  Diet: reg + thins - suggested straw and mild chin tuck w/ water  IVFs: d/c  PPI po q24  d/c maalox  zofran prn    # DM - well controlled; no retinopathy, neuropathy, nephropathy  FS usually good  A1c 6.5  on metformin at home  FS 1x/day    # decr asa to 81mg po q24    # HTN  c/w lasix, ARB/HCTZ,     # DLD  statin    # DVT ppx: LMWH    # activity: independent     # updated  at bedside    dispo: d/c home today

## 2022-07-13 NOTE — DISCHARGE NOTE PROVIDER - NSDCFUADDINST_GEN_ALL_CORE_FT
Follow up in [facility] clinic next available [facility] clinic is at [address] [place] [address] . [place] [address] . Phone number  [phone]

## 2022-07-13 NOTE — DISCHARGE NOTE PROVIDER - NSDCCPCAREPLAN_GEN_ALL_CORE_FT
PRINCIPAL DISCHARGE DIAGNOSIS  Diagnosis: Dysphagia  Assessment and Plan of Treatment: You were diagnosed with difficulty swallowing. All the workup for the same done in the hospital did not reveal any cause. You tolerated food well in the hospital. Follow-up with Gastroenterology doctor Dr. Corrigan as an outpatient for further workup and management of the same.

## 2022-07-13 NOTE — DISCHARGE NOTE PROVIDER - HOSPITAL COURSE
77 year old female with history of DM, HTN, HLD presents with worsening esophageal dysphagia over the last 4 weeks. Pt states her symptoms started with regurgitation of solids initially now progressed to liquids. She came in to the ED because she had an acute episode of choking after drinking water. She follows Dr. Brink PCP o/p. She has not had any formal diagnostic work up for this issue. GI was consulted and endoscopy was performed which showed non erosive gastritis and biopsy was taken. Speech and swallow was consulted and recommendation was to start patient on regular diet and thin liquids. ENT performed nasal endoscopy no abnormal findings. CT neck non contrast was normal. Barium esophagogram was performed which did not reveal pathology that could explain patients symptoms but was significant for hiatal hernia and . Patient is able to tolerate food and liquids in the hospital and is being sent home for outpatient follow-up with GI.

## 2022-07-13 NOTE — DISCHARGE NOTE NURSING/CASE MANAGEMENT/SOCIAL WORK - PATIENT PORTAL LINK FT
You can access the FollowMyHealth Patient Portal offered by [facility] by registering at the following website: [email or web address]. By joining [employer] [facility]’s FollowMyHealth portal, you will also be able to view your health information using other applications (apps) compatible with our system.

## 2022-07-13 NOTE — DISCHARGE NOTE PROVIDER - NSDCMRMEDTOKEN_GEN_ALL_CORE_FT
aspirin 81 mg oral tablet: 1 tab(s) orally once a day  atorvastatin 10 mg oral tablet: 1 tab(s) orally once a day  Fish Oil 1000 mg oral capsule: 1 cap(s) orally once a day  Lasix 20 mg oral tablet: 1 tab(s) orally once a day  metFORMIN 500 mg oral tablet: 1 tab(s) orally 2 times a day  valsartan-hydrochlorothiazide 80 mg-12.5 mg oral tablet: 1 tab(s) orally once a day   aspirin 81 mg oral tablet: 1 tab(s) orally once a day  atorvastatin 10 mg oral tablet: 1 tab(s) orally once a day  Fish Oil 1000 mg oral capsule: 1 cap(s) orally once a day  Lasix 20 mg oral tablet: 1 tab(s) orally once a day  metFORMIN 500 mg oral tablet: 1 tab(s) orally 2 times a day  ondansetron 4 mg oral disintegrating strip: 1 each orally once a day if nausea/vomiting, else not needed.  pantoprazole 40 mg oral delayed release tablet: 1 tab(s) orally 2 times a day for 4 weeks and then one tablet once daily to continue  valsartan-hydrochlorothiazide 80 mg-12.5 mg oral tablet: 1 tab(s) orally once a day

## 2022-07-13 NOTE — PROGRESS NOTE ADULT - SUBJECTIVE AND OBJECTIVE BOX
Gastroenterology progress note:     Patient is a 77y old  Female who presents with a chief complaint of Dysphagia evaluation (13 Jul 2022 16:21)       Admitted on: 07-10-22    We are following the patient for dysphagia      Interval History: patient was admitted for acute dysphagia s/p EGD yesterday with biopsies, no food bolus was noted in the esophagus     No acute events overnight.   - Diet - advanced , passes speech and swallow   - last BM - before admission   - Abdominal pain - denies pain   - ENT evaluation within normal       PAST MEDICAL & SURGICAL HISTORY:  Diabetes mellitus      Hypertension      S/P appendectomy      S/P cholecystectomy          MEDICATIONS  (STANDING):  aspirin  chewable 81 milliGRAM(s) Oral daily  atorvastatin 10 milliGRAM(s) Oral at bedtime  dextrose 5%. 1000 milliLiter(s) (50 mL/Hr) IV Continuous <Continuous>  dextrose 5%. 1000 milliLiter(s) (100 mL/Hr) IV Continuous <Continuous>  dextrose 50% Injectable 25 Gram(s) IV Push once  dextrose 50% Injectable 12.5 Gram(s) IV Push once  dextrose 50% Injectable 25 Gram(s) IV Push once  enoxaparin Injectable 40 milliGRAM(s) SubCutaneous every 24 hours  furosemide    Tablet 20 milliGRAM(s) Oral daily  glucagon  Injectable 1 milliGRAM(s) IntraMuscular once  hydrochlorothiazide 12.5 milliGRAM(s) Oral daily  insulin lispro (ADMELOG) corrective regimen sliding scale   SubCutaneous three times a day before meals  pantoprazole    Tablet 40 milliGRAM(s) Oral two times a day  valsartan 80 milliGRAM(s) Oral daily    MEDICATIONS  (PRN):  acetaminophen     Tablet .. 650 milliGRAM(s) Oral every 6 hours PRN Temp greater or equal to 38C (100.4F), Mild Pain (1 - 3)  dextrose Oral Gel 15 Gram(s) Oral once PRN Blood Glucose LESS THAN 70 milliGRAM(s)/deciliter  melatonin 3 milliGRAM(s) Oral at bedtime PRN Insomnia  ondansetron Injectable 4 milliGRAM(s) IV Push every 8 hours PRN Nausea and/or Vomiting      Allergies  No Known Allergies      Review of Systems:   Cardiovascular:  No Chest Pain, No Palpitations  Respiratory:  No Cough, No Dyspnea  Gastrointestinal:  As described in HPI  eye no vision changes   RUFINO no weakness   HENT no headache   psych no mood changes   Skin:  No Skin Lesions, No Jaundice  Neuro:  No Syncope, No Dizziness    Physical Examination:  T(C): 35.8 (07-13-22 @ 13:53), Max: 36.5 (07-12-22 @ 19:55)  HR: 65 (07-13-22 @ 13:53) (65 - 91)  BP: 132/59 (07-13-22 @ 13:53) (121/59 - 147/70)  RR: 18 (07-13-22 @ 13:53) (18 - 18)  SpO2: 97% (07-13-22 @ 00:09) (97% - 98%)        GENERAL: AAOx3, no acute distress.  HEAD:  Atraumatic, Normocephalic  EYES: conjunctiva and sclera clear  NECK: Supple, no JVD or thyromegaly  CHEST/LUNG: Clear to auscultation bilaterally; No wheeze, rhonchi, or rales  HEART: Regular rate and rhythm; normal S1, S2, No murmurs.  ABDOMEN: Soft, nontender, nondistended; Bowel sounds present  NEUROLOGY: No asterixis or tremor.   SKIN: Intact, no jaundice     Data:                        12.2   5.66  )-----------( 246      ( 13 Jul 2022 06:25 )             37.7     Hgb trend:  12.2  07-13-22 @ 06:25  12.6  07-12-22 @ 07:51  12.5  07-11-22 @ 08:31  14.4  07-10-22 @ 19:10      07-13    141  |  105  |  10  ----------------------------<  115<H>  3.7   |  25  |  0.7    Ca    8.6      13 Jul 2022 06:25  Mg     1.9     07-13    TPro  5.7<L>  /  Alb  3.7  /  TBili  0.4  /  DBili  x   /  AST  23  /  ALT  33  /  AlkPhos  84  07-12    Liver panel trend:  TBili 0.4   /   AST 23   /   ALT 33   /   AlkP 84   /   Tptn 5.7   /   Alb 3.7    /   DBili --      07-12  TBili 0.5   /   AST 20   /   ALT 31   /   AlkP 90   /   Tptn 5.9   /   Alb 4.0    /   DBili --      07-11  TBili 0.4   /   AST 28   /   ALT 38   /   AlkP 100   /   Tptn 6.9   /   Alb 4.4    /   DBili --      07-10             Radiology:    < from: Xray Esophagram Single Contrast (07.13.22 @ 16:08) >    Minimal evidence of tertiary contraction.  No hiatal hernia.    < end of copied text >

## 2022-07-13 NOTE — DISCHARGE NOTE PROVIDER - CARE PROVIDER_API CALL
Dannie Corrigan)  Gastroenterology; Internal Medicine  [address] [place] [address]  Phone: [phone]  Fax: [phone]  Follow Up Time: 1 week

## 2022-07-13 NOTE — PROGRESS NOTE ADULT - SUBJECTIVE AND OBJECTIVE BOX
DERECK PEREZ  77y  Female  ***My note supersedes ALL resident notes that I sign.  My corrections for their notes are in my note.***    I can be reached directly on [employer]. My office number is [phone]. My personal cell number is [phone].    INTERVAL EVENTS: Here for f/u of dysphagia. Testing neg. Symptoms gone. Pt ready to go home.    T(F): 96.4 (07-13-22 @ 13:53), Max: 97.7 (07-12-22 @ 19:55)  HR: 65 (07-13-22 @ 13:53) (65 - 91)  BP: 132/59 (07-13-22 @ 13:53) (121/59 - 147/70)  RR: 18 (07-13-22 @ 13:53) (18 - 18)  SpO2: 97% (07-13-22 @ 00:09) (97% - 98%)    Gen: NAD  HEENT: PERRL, EOMI, mouth clr, nose clr  Neck: no nodes, no JVD, thyroid nl  lungs: clr  hrt: s1 s2 rrr no murmur  abd: soft, NT/ND, no HS megaly  ext: no edema, no c/c  neuro: aa ox3, cn intact, can move all 4 ext    LABS:             12.2    (    81.8   5.66  )-----------( ---------      246      ( 13 Jul 2022 06:25 )             37.7    (    15.4     141   (   105   (   115      07-13-22 @ 06:25  ----------------------               3.7   (   25   (   10                             -----                        0.7  Ca  8.6   Mg  1.9    P   --     RADIOLOGY & ADDITIONAL TESTS:  < from: Xray Esophagram Single Contrast (07.13.22 @ 16:08) >  IMPRESSION:    Minimal evidence of tertiary contraction.  Small hiatal hernia.    < end of copied text >    < from: CT Neck Soft Tissue No Cont (07.12.22 @ 19:00) >  Impression:    Unremarkable noncontrast CT examination of the neck.    < end of copied text >    MEDICATIONS:    acetaminophen     Tablet .. 650 milliGRAM(s) Oral every 6 hours PRN  aspirin  chewable 81 milliGRAM(s) Oral daily  atorvastatin 10 milliGRAM(s) Oral at bedtime  enoxaparin Injectable 40 milliGRAM(s) SubCutaneous every 24 hours  furosemide    Tablet 20 milliGRAM(s) Oral daily  glucagon  Injectable 1 milliGRAM(s) IntraMuscular once  hydrochlorothiazide 12.5 milliGRAM(s) Oral daily  insulin lispro (ADMELOG) corrective regimen sliding scale   SubCutaneous three times a day before meals  melatonin 3 milliGRAM(s) Oral at bedtime PRN  ondansetron Injectable 4 milliGRAM(s) IV Push every 8 hours PRN  pantoprazole    Tablet 40 milliGRAM(s) Oral two times a day  valsartan 80 milliGRAM(s) Oral daily

## 2022-07-13 NOTE — PROGRESS NOTE ADULT - ASSESSMENT
77 year old female with history of DM, HTN, HLD presents with worsening esophageal dysphagia over the last 4 weeks. Today patient was not able to drink liquids and intermittently spitting her saliva     #Dysphagia to solids and liquids ( mainly)  +/- globus sensation:  s/p EGD no food impaction seen, ulceration and erythematous mucosa noted at 4 cm above GEJ,  but no definite narrowing or stricture noted, multiple biopsies taken from this area, lower, and mid esophagus   - pathology showing Squamous mucosa showing scattered intraepithelial eosinophils and few superficial neutrophils suggestive of reflux.  - passed speech and swallow eval   - ENT evaluation within normal   CT neck non contrast: within normal      REC:   - advance diet as tolerated   - barium esophagogram noted   - Esophageal manometry   - will double check with pathologist regarding Eosinophilic count to rule out EOE   - Pantoprazole 40 mg po bid for 4 weeks then 40 mg po daily   - F/u with [facility]     #HCM: Follow up in [facility] next available [facility] clinic is at [address] [place] [address] . [place] [address] . Phone number  [phone]

## 2022-07-13 NOTE — DISCHARGE NOTE NURSING/CASE MANAGEMENT/SOCIAL WORK - NSDCPEFALRISK_GEN_ALL_CORE
For information on Fall & Injury Prevention, visit: [email or web address] OR  [email or web address] OR  https://www.cdc.gov/steadi/patient.html

## 2022-07-15 PROBLEM — I10 ESSENTIAL (PRIMARY) HYPERTENSION: Chronic | Status: ACTIVE | Noted: 2022-07-11

## 2022-07-15 PROBLEM — E11.9 TYPE 2 DIABETES MELLITUS WITHOUT COMPLICATIONS: Chronic | Status: ACTIVE | Noted: 2022-07-11

## 2022-07-20 DIAGNOSIS — R13.10 DYSPHAGIA, UNSPECIFIED: ICD-10-CM

## 2022-07-20 DIAGNOSIS — K29.70 GASTRITIS, UNSPECIFIED, WITHOUT BLEEDING: ICD-10-CM

## 2022-07-20 DIAGNOSIS — E11.9 TYPE 2 DIABETES MELLITUS WITHOUT COMPLICATIONS: ICD-10-CM

## 2022-07-20 DIAGNOSIS — K29.80 DUODENITIS WITHOUT BLEEDING: ICD-10-CM

## 2022-07-20 DIAGNOSIS — Z79.84 LONG TERM (CURRENT) USE OF ORAL HYPOGLYCEMIC DRUGS: ICD-10-CM

## 2022-07-20 DIAGNOSIS — I10 ESSENTIAL (PRIMARY) HYPERTENSION: ICD-10-CM

## 2022-10-03 ENCOUNTER — NON-APPOINTMENT (OUTPATIENT)
Age: 77
End: 2022-10-03

## 2022-10-17 ENCOUNTER — APPOINTMENT (OUTPATIENT)
Dept: HEPATOLOGY | Facility: CLINIC | Age: 77
End: 2022-10-17

## 2022-11-01 ENCOUNTER — APPOINTMENT (OUTPATIENT)
Dept: UROLOGY | Facility: CLINIC | Age: 77
End: 2022-11-01

## 2022-12-16 ENCOUNTER — APPOINTMENT (OUTPATIENT)
Dept: ORTHOPEDIC SURGERY | Facility: CLINIC | Age: 77
End: 2022-12-16

## 2022-12-16 VITALS — WEIGHT: 230 LBS | BODY MASS INDEX: 40.75 KG/M2 | HEIGHT: 63 IN

## 2022-12-16 DIAGNOSIS — S80.01XA CONTUSION OF RIGHT KNEE, INITIAL ENCOUNTER: ICD-10-CM

## 2022-12-16 DIAGNOSIS — S46.012A STRAIN OF MUSCLE(S) AND TENDON(S) OF THE ROTATOR CUFF OF LEFT SHOULDER, INITIAL ENCOUNTER: ICD-10-CM

## 2022-12-16 PROCEDURE — 99203 OFFICE O/P NEW LOW 30 MIN: CPT

## 2022-12-16 NOTE — DATA REVIEWED
[Shoulder] : shoulder [Outside X-rays] : outside x-rays [Right] : of the right [Knee] : knee [FreeTextEntry2] :   [facility] taken at [facility] of the right knee showed no fractures.  No soft tissue calcifications, no bone masses. [FreeTextEntry1] :   X-rays taken earlier today at [facility] of the left shoulder showed no fractures.  No soft tissue calcifications, no bone masses.  Slight sclerosis noted of the humeral head.

## 2022-12-16 NOTE — PHYSICAL EXAM
[Left] : left shoulder [Right] : right knee [NL (0)] : extension 0 degrees [Equivocal] : equivocal Yanira [FreeTextEntry9] :   Active forward flexion to 140°, passive forward flexion to 170°.  Active abduction to 120 °, active internal rotation to L4 [de-identified] :  Weakness with rotator cuff resistance testing [] : patient ambulates with assistive device [FreeTextEntry3] :  Mild edema.  No effusion.  Tiny abrasion noted in the infrapatellar area no erythema or drainage noted. [TWNoteComboBox7] : flexion 120 degrees

## 2022-12-16 NOTE — HISTORY OF PRESENT ILLNESS
[de-identified] : The patient is a 77-year-old female right-hand-dominant accompanied by her spouse here for evaluation of her left shoulder and right knee.  She had a fall 1 day ago landing on the left shoulder than the right knee.  She was seen and evaluated at [facility] and had x-rays left shoulder and right knee that she brought here on a disc for our review.  She is ambulating with a cane.  She was prescribed ibuprofen 600 mg earlier today at [facility] but has not picked up the medicine yet.

## 2022-12-16 NOTE — DISCUSSION/SUMMARY
[de-identified] :   The x-ray images were imported into our PACS system.  She will start formal physical therapy for the left shoulder and right knee.  She will obtain an MRI left shoulder to evaluate for a rotator cuff tear.  She for about a cortisone injection, however she is a diabetic that does not do fingersticks so we will hold off on that.  She will  the ibuprofen 600 mg and was prescribed earlier for her and use Tylenol in conjunction for pain.  She will ice the left shoulder and right knee.  She will call me 2 days after the MRI is performed so we can discuss results.  She will start to formal physical therapy.  She is traveling to [place] on 12/31/2022 for 6 months.\par \par Supervising physician:  Dr. Schwartz

## 2023-06-27 VITALS
BODY MASS INDEX: 39.15 KG/M2 | SYSTOLIC BLOOD PRESSURE: 118 MMHG | HEIGHT: 65 IN | DIASTOLIC BLOOD PRESSURE: 68 MMHG | HEART RATE: 69 BPM | WEIGHT: 235 LBS

## 2023-08-14 ENCOUNTER — NON-APPOINTMENT (OUTPATIENT)
Age: 78
End: 2023-08-14

## 2023-08-14 DIAGNOSIS — Z87.39 PERSONAL HISTORY OF OTHER DISEASES OF THE MUSCULOSKELETAL SYSTEM AND CONNECTIVE TISSUE: ICD-10-CM

## 2023-08-14 DIAGNOSIS — E66.3 OVERWEIGHT: ICD-10-CM
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
## 2023-08-14 DIAGNOSIS — M79.89 OTHER SPECIFIED SOFT TISSUE DISORDERS: ICD-10-CM

## 2023-08-14 DIAGNOSIS — Z92.89 PERSONAL HISTORY OF OTHER MEDICAL TREATMENT: ICD-10-CM

## 2023-08-14 DIAGNOSIS — R29.2 ABNORMAL REFLEX: ICD-10-CM

## 2023-08-14 DIAGNOSIS — R41.0 DISORIENTATION, UNSPECIFIED: ICD-10-CM

## 2023-09-15 ENCOUNTER — APPOINTMENT (OUTPATIENT)
Dept: NEUROLOGY | Facility: CLINIC | Age: 78
End: 2023-09-15
Payer: MEDICARE

## 2023-09-15 ENCOUNTER — LABORATORY RESULT (OUTPATIENT)
Age: 78
End: 2023-09-15

## 2023-09-15 DIAGNOSIS — G83.9 PARALYTIC SYNDROME, UNSPECIFIED: ICD-10-CM

## 2023-09-15 PROCEDURE — 99214 OFFICE O/P EST MOD 30 MIN: CPT

## 2023-09-15 RX ORDER — VALSARTAN AND HYDROCHLOROTHIAZIDE 160; 12.5 MG/1; MG/1
160-12.5 TABLET, FILM COATED ORAL
Refills: 0 | Status: DISCONTINUED | COMMUNITY
End: 2023-09-15

## 2023-09-15 RX ORDER — DIAZEPAM 5 MG/1
5 TABLET ORAL
Qty: 2 | Refills: 0 | Status: DISCONTINUED | COMMUNITY
Start: 2022-12-16 | End: 2023-09-15

## 2023-12-14 ENCOUNTER — APPOINTMENT (OUTPATIENT)
Dept: NEUROLOGY | Facility: CLINIC | Age: 78
End: 2023-12-14
Payer: MEDICARE

## 2023-12-14 VITALS
RESPIRATION RATE: 15 BRPM | HEART RATE: 69 BPM | SYSTOLIC BLOOD PRESSURE: 116 MMHG | OXYGEN SATURATION: 91 % | BODY MASS INDEX: 37.49 KG/M2 | HEIGHT: 65 IN | DIASTOLIC BLOOD PRESSURE: 77 MMHG | WEIGHT: 225 LBS

## 2023-12-14 DIAGNOSIS — Z86.69 PERSONAL HISTORY OF OTHER DISEASES OF THE NERVOUS SYSTEM AND SENSE ORGANS: ICD-10-CM

## 2023-12-14 PROCEDURE — 95910 NRV CNDJ TEST 7-8 STUDIES: CPT

## 2023-12-14 PROCEDURE — 95886 MUSC TEST DONE W/N TEST COMP: CPT

## 2023-12-14 PROCEDURE — 99213 OFFICE O/P EST LOW 20 MIN: CPT | Mod: 25

## 2023-12-14 NOTE — DISCUSSION/SUMMARY
[FreeTextEntry1] : Emphasize importance of work up by nephrologist again in length during office visit. She agrees to consult specialist within [facility] system. Proceed to have lumbar spine MRI to discern compressive etiology of the S1 radiculopathy.

## 2023-12-14 NOTE — PHYSICAL EXAM
[FreeTextEntry1] : General Appearance - Well groomed, Not in acute distress. Build & Nutrition - Well nourished.  Head and Neck Head - normocephalic, atraumatic with no lesions or palpable masses.  Neurologic Mental Status Affect - normal. Speech - Normal. Thought content/perception - Normal. Cognitive function - Normal. Cranial Nerves I Olfactory - Normal. II Optic - Visual fields - Normal. III Oculomotor - Normal Bilaterally. IV Trochlear - Bilateral - Normal - Bilateral. V Trigeminal - Normal Bilaterally. VI Abducens - Bilateral - Normal - Bilateral. VII Facial - Normal Bilaterally. VIII Acoustic - Bilateral - Hearing normal - Bilateral. IX Glossopharyngeal / X Vagus - Normal. XI Accessory - Normal Bilaterally. XII Hypoglossal - Bilateral - Normal - Bilateral. Sensory Light Touch - Intact - Globally. Pain - Intact - Globally. Temperature - Intact - Globally. Vibration - Decreased - Distal extremities-"stocking-glove" pattern (up to ankles). Proprioception - Bilateral - Normal - Bilateral. Motor Bulk and Contour - Normal. Tone - normal - Left Lower Extremity and Right Lower Extremity. Note: minimal. Strength - 5/5 normal muscle strength - All Muscles (except graded weak muscles). 5-/5 reduced muscle strength - Note: bilateral hip flexion. Reflexes (Dermatomes) 3/2 Brisk - in upper extremities and knees. Ankles: 1-2+. Plantar Reflexes (L4-S2) - Flexion on both sides. Coordination - Transfer from sitting to standing with assistance (holding on arm rest) , Tandem walking impaired, Walking on toes impaired (not able to stand with left foot alone) , Walking on heels impaired and Romberg sign positive. Gait - Small steps and Medium-based.  Results of Diagnostic Studies  Labs: Note: 12/28/18 mild elevated alpha 1 and alpha 2 protein. 8/2020 routine, TFT, LFT normal. 9/9/20 remarkable for ALT 48, low albumin and gamma globulin, suggestive of nephrotic syndrome. 8/22/23 lab remarkable for ALT 31, low albumin and gamma globulin, elevated alpha 2 globulin, suggestive of nephrotic syndrome, 3 IgG band positive in Lyme disease antibodies, otherwise unremarkable. 9/15/23 Lyme: negative.  Imaging: MRI: Note: 10/22/18 brain: reported small amount of ischemic changes. Images reviewed, subtle right superior frontal meningeal thickening. 11/30/18 brain with contrast: no new findings. 8/21/2020 brain with contrast: reported same findings, compared with study in 6/2020. Images reviewed. 9/5/23 brain with and without contrast: reported mild ischemic changes. Images reviewed. CT Scan: Note: 12/12/18 abdominal: no mass lesion in abdomen and pelvis. 6/29/2020 CTA chest: reported no intra chest pathologies, large renal cysts bilaterally. 7/28/2020 thoracic spine X-ray reported unremarkable.  Neurophysiological Testing - Note: 12/22/18 NCS/EMG: myopathy, generalized. 8/18/2020 EEG: normal. 9/2/2020 NCS/EMG: chronic generalized myopathic dysfunction. 12/14/23 NCS/EMG:: bilateral S1 radiculopathy.  Other Testing - Note: 8/18/2020 MoCA 23/2020. Not a pattern of memory loss.  Other pertinent findings - Note: 2+ pitting edema distal lower extremities.   Neuropsychiatric Mental status exam performed with findings of - no evidence of hallucinations, delusions, obsessions or homicidal/suicidal ideation.  Musculoskeletal Spine/Ribs/Pelvis Cervical Spine - Examination of the cervical spine reveals - no tenderness to palpation, no pain, normal cervical spine movements and normal posture. Thoracic (Dorsal) Spine - Examination of the thoracic spine reveals - no tenderness over thoracic vertebrae, no pain, no paraspinous muscle spasm and normal thoracic spine movements. Lumbosacral Spine - Evaluation of related systems reveals - Straight leg raising negative. Examination of the lumbosacral spine reveals - no tenderness to palpation, no pain, no paraspinous muscle spasm and normal lumbosacral spine movements. Upper Extremity  Ulna: Inspection and Palpation - Tenderness - Tinel sign at cubital tunnel not present. Hand/Wrist: Wrist: Inspection and Palpation - Tenderness - Tinel sign of wrist negative and Phalan sign of wrist negative.

## 2023-12-14 NOTE — HISTORY OF PRESENT ILLNESS
[FreeTextEntry1] : Since May, 2020. She developed more weakness and poor balance. She was in [place]. Local work up showed kidney cysts. She also had severe mid-back pain. Even breathing exacerbated pain. She was given Narcotics for pain, which resulted in hallucination. She was not willing to have imaging study to thoracic spine. She denied stiffness or sphincter control abnormality.  Mid-back pain has been persistent. However, she still has not seen by nephrologist since last visit.  Weakness was most severe in proximal extremities. Myalgia only noticeable when she stands for a long time.  No fall since last visit. Balance control has improved.  However, she developed intermittent numbness of bottom of feet. She denies low back pain.  B/B are normal. No longer feels stiff in her legs.  No worsening of cognitive difficulty. No episodes of confusion.

## 2023-12-18 ENCOUNTER — APPOINTMENT (OUTPATIENT)
Dept: NEPHROLOGY | Facility: CLINIC | Age: 78
End: 2023-12-18
Payer: MEDICARE

## 2023-12-18 VITALS
OXYGEN SATURATION: 5 % | BODY MASS INDEX: 38.2 KG/M2 | SYSTOLIC BLOOD PRESSURE: 126 MMHG | HEIGHT: 65 IN | DIASTOLIC BLOOD PRESSURE: 78 MMHG | TEMPERATURE: 98.3 F | WEIGHT: 229.28 LBS | HEART RATE: 79 BPM

## 2023-12-18 DIAGNOSIS — N04.9 NEPHROTIC SYNDROME WITH UNSPECIFIED MORPHOLOGIC CHANGES: ICD-10-CM

## 2023-12-18 LAB
BILIRUB UR QL STRIP: NORMAL
CLARITY UR: CLEAR
COLLECTION METHOD: NORMAL
GLUCOSE UR-MCNC: NORMAL
HCG UR QL: 0.2 EU/DL
HGB UR QL STRIP.AUTO: NORMAL
KETONES UR-MCNC: NORMAL
LEUKOCYTE ESTERASE UR QL STRIP: NORMAL
NITRITE UR QL STRIP: NORMAL
PH UR STRIP: 6
PROT UR STRIP-MCNC: NORMAL
SP GR UR STRIP: 1.01

## 2023-12-18 PROCEDURE — 81003 URINALYSIS AUTO W/O SCOPE: CPT | Mod: QW

## 2023-12-18 PROCEDURE — 99204 OFFICE O/P NEW MOD 45 MIN: CPT

## 2023-12-18 NOTE — HISTORY OF PRESENT ILLNESS
[FreeTextEntry1] : Mattie Liao is a 79 yo F with history of HTN, dyslipidemia, obesity and chronic LBP, now referred to Nephrology for evaluation of kidney cyst.  She was referred due to findings of b/l renal cysts and presents with numerous results of bloodwork and MRI studies, but no imaging of the kidneys was included in her paperwork.  Provided labwork noted a low serum protein profile, reporting likely due to "Nephrotic Syndrome", with no monoclonal expansion reported.  Brain MRI was performed due to ataxia without suspicous findings.  She notes persistent back pain isolated to her mid-back without radiation.  Also reports a history of urinary incontinence and frequency, multiparous with 3 live births in the past.  The patient notes no prior Renal evaluation or history of nephritis, nephrosis, brights disease, scarlet fever or rheumatic heart disease. The patient denied chronic UTI's, bladder infections, cystitis, or pyelonephritis. No known hematuria, proteinuria.   Recent Hospitalizations: none Recent Medications changes: none NSAIDS use: denies use Home BP: no abnormal readings noted Home FSBS:  no abnormal readings noted Uremic Symptoms: no pruritis, metallic taste, new onset weakness, dysphagia, poor appetite, or tremors noted FamHx CKD/RRT:  denies Personal History of CKD/RRT:  denies

## 2023-12-18 NOTE — ASSESSMENT
[FreeTextEntry1] : #) Kidney Cyst Unclear findings as cyst was reported but no imaging provided on encounter today No microscopic hematuria noted on POCT U/A in office today - ordered Renal and Bladder U/S for cyst size and characterization - If any suspicious findings we will need to refer to Urology for decortication or drainage, or possible partial nephrectomy if pre-malignant  #) Incontinence - Bladder imaging as above - We discussed use of oxybutynin or other anticholinergic agents for treatment, holding off Rx for now  #) ?Nephrotic Syndrome No signs or symptoms of nephrotic syndrome noted, and no suspicion noted on provided labwork or imaging - Labwork ordered for U/A, UPCR, UACR, CBC, CMP, Cystatin C, Vit D 25-OH  #) Essential HTN:  BP controlled in clinic today  - advised salt restrictive diet of <2.4gm daily  - continue to monitor home BP readings and report in future clinic appointments  - continue use of Lasix 20mg daily, and Valsartan-HCTZ 320mg/12.5mg daily  #) Dyslipidemia:  LDL Controlled, TriG wnl  - Continue use of Atorvastatin - Rechecking Lipid Panel

## 2023-12-18 NOTE — PHYSICAL EXAM
[General Appearance - Alert] : alert [General Appearance - In No Acute Distress] : in no acute distress [Sclera] : the sclera and conjunctiva were normal [PERRL With Normal Accommodation] : pupils were equal in size, round, and reactive to light [Extraocular Movements] : extraocular movements were intact [Outer Ear] : the ears and nose were normal in appearance [Oropharynx] : the oropharynx was normal [Neck Appearance] : the appearance of the neck was normal [Neck Cervical Mass (___cm)] : no neck mass was observed [Jugular Venous Distention Increased] : there was no jugular-venous distention [Thyroid Diffuse Enlargement] : the thyroid was not enlarged [Thyroid Nodule] : there were no palpable thyroid nodules [Auscultation Breath Sounds / Voice Sounds] : lungs were clear to auscultation bilaterally [Heart Rate And Rhythm] : heart rate was normal and rhythm regular [Heart Sounds] : normal S1 and S2 [Heart Sounds Gallop] : no gallops [Murmurs] : no murmurs [Heart Sounds Pericardial Friction Rub] : no pericardial rub [Full Pulse] : the pedal pulses are present [Edema] : there was no peripheral edema [Bowel Sounds] : normal bowel sounds [Abdomen Soft] : soft [Abdomen Tenderness] : non-tender [Abdomen Mass (___ Cm)] : no abdominal mass palpated [No CVA Tenderness] : no ~M costovertebral angle tenderness [No Spinal Tenderness] : no spinal tenderness [Nail Clubbing] : no clubbing  or cyanosis of the fingernails [Abnormal Walk] : normal gait [Musculoskeletal - Swelling] : no joint swelling seen [Motor Tone] : muscle strength and tone were normal [Skin Color & Pigmentation] : normal skin color and pigmentation [Skin Turgor] : normal skin turgor [] : no rash [Motor Exam] : the motor exam was normal [No Focal Deficits] : no focal deficits [Oriented To Time, Place, And Person] : oriented to person, place, and time [Impaired Insight] : insight and judgment were intact [Affect] : the affect was normal

## 2023-12-19 DIAGNOSIS — F40.240 CLAUSTROPHOBIA: ICD-10-CM

## 2023-12-19 LAB
25(OH)D3 SERPL-MCNC: 38 NG/ML
ALBUMIN SERPL ELPH-MCNC: 4.3 G/DL
ALP BLD-CCNC: 106 U/L
ALT SERPL-CCNC: 33 U/L
ANION GAP SERPL CALC-SCNC: 17 MMOL/L
AST SERPL-CCNC: 23 U/L
BILIRUB SERPL-MCNC: 0.3 MG/DL
BUN SERPL-MCNC: 15 MG/DL
CALCIUM SERPL-MCNC: 10 MG/DL
CHLORIDE SERPL-SCNC: 100 MMOL/L
CHOLEST SERPL-MCNC: 193 MG/DL
CO2 SERPL-SCNC: 22 MMOL/L
CREAT SERPL-MCNC: 0.9 MG/DL
CREAT SPEC-SCNC: 51 MG/DL
CREAT SPEC-SCNC: 53 MG/DL
CREAT/PROT UR: 0.1 RATIO
CYSTATIN C SERPL-MCNC: 1.21 MG/L
EGFR: 66 ML/MIN/1.73M2
GFR/BSA.PRED SERPLBLD CYS-BASED-ARV: 53 ML/MIN/1.73M2
GLUCOSE SERPL-MCNC: 107 MG/DL
HCT VFR BLD CALC: 43.1 %
HDLC SERPL-MCNC: 42 MG/DL
HGB BLD-MCNC: 13.7 G/DL
LDLC SERPL CALC-MCNC: 87 MG/DL
MCHC RBC-ENTMCNC: 27 PG
MCHC RBC-ENTMCNC: 31.8 G/DL
MCV RBC AUTO: 84.8 FL
MICROALBUMIN 24H UR DL<=1MG/L-MCNC: 1.8 MG/DL
MICROALBUMIN/CREAT 24H UR-RTO: 33 MG/G
NONHDLC SERPL-MCNC: 151 MG/DL
PLATELET # BLD AUTO: 315 K/UL
PMV BLD: 9.5 FL
POTASSIUM SERPL-SCNC: 4 MMOL/L
PROT SERPL-MCNC: 7 G/DL
PROT UR-MCNC: 5 MG/DLG/24H
RBC # BLD: 5.08 M/UL
RBC # FLD: 15.6 %
SODIUM SERPL-SCNC: 139 MMOL/L
TRIGL SERPL-MCNC: 319 MG/DL
WBC # FLD AUTO: 8.93 K/UL

## 2023-12-19 RX ORDER — ALPRAZOLAM 1 MG/1
1 TABLET ORAL DAILY
Qty: 2 | Refills: 0 | Status: ACTIVE | COMMUNITY
Start: 2023-12-19 | End: 1900-01-01

## 2023-12-21 ENCOUNTER — RESULT REVIEW (OUTPATIENT)
Age: 78
End: 2023-12-21

## 2023-12-21 ENCOUNTER — OUTPATIENT (OUTPATIENT)
Dept: OUTPATIENT SERVICES | Facility: HOSPITAL | Age: 78
LOS: 1 days | End: 2023-12-21
Payer: MEDICARE

## 2023-12-21 DIAGNOSIS — Z90.49 ACQUIRED ABSENCE OF OTHER SPECIFIED PARTS OF DIGESTIVE TRACT: Chronic | ICD-10-CM

## 2023-12-21 DIAGNOSIS — Z00.8 ENCOUNTER FOR OTHER GENERAL EXAMINATION: ICD-10-CM

## 2023-12-21 DIAGNOSIS — M54.18 RADICULOPATHY, SACRAL AND SACROCOCCYGEAL REGION: ICD-10-CM

## 2023-12-21 LAB
APPEARANCE: ABNORMAL
BACTERIA: ABNORMAL /HPF
BILIRUBIN URINE: NEGATIVE
BLOOD URINE: NEGATIVE
COLOR: YELLOW
GLUCOSE QUALITATIVE U: NEGATIVE MG/DL
KETONES URINE: NEGATIVE MG/DL
LEUKOCYTE ESTERASE URINE: ABNORMAL
MICROSCOPIC-UA: NORMAL
NITRITE URINE: NEGATIVE
PH URINE: 8.5
PROTEIN URINE: 30 MG/DL
RED BLOOD CELLS URINE: 0 /HPF
SPECIFIC GRAVITY URINE: 1.02
SQUAMOUS EPITHELIAL CELLS: PRESENT
URINE COMMENTS: NORMAL
UROBILINOGEN URINE: 0.2 MG/DL
WHITE BLOOD CELLS URINE: NORMAL /HPF

## 2023-12-21 PROCEDURE — 72148 MRI LUMBAR SPINE W/O DYE: CPT | Mod: 26,MH

## 2023-12-21 PROCEDURE — 72148 MRI LUMBAR SPINE W/O DYE: CPT

## 2023-12-22 DIAGNOSIS — M54.18 RADICULOPATHY, SACRAL AND SACROCOCCYGEAL REGION: ICD-10-CM

## 2023-12-27 ENCOUNTER — APPOINTMENT (OUTPATIENT)
Dept: NEPHROLOGY | Facility: CLINIC | Age: 78
End: 2023-12-27
Payer: MEDICARE

## 2023-12-27 VITALS
OXYGEN SATURATION: 95 % | WEIGHT: 225 LBS | BODY MASS INDEX: 37.49 KG/M2 | HEIGHT: 65 IN | TEMPERATURE: 97.6 F | HEART RATE: 82 BPM | DIASTOLIC BLOOD PRESSURE: 76 MMHG | SYSTOLIC BLOOD PRESSURE: 118 MMHG

## 2023-12-27 DIAGNOSIS — E78.1 PURE HYPERGLYCERIDEMIA: ICD-10-CM

## 2023-12-27 DIAGNOSIS — N18.31 CHRONIC KIDNEY DISEASE, STAGE 3A: ICD-10-CM

## 2023-12-27 PROCEDURE — 99214 OFFICE O/P EST MOD 30 MIN: CPT

## 2023-12-27 NOTE — PHYSICAL EXAM
[General Appearance - Alert] : alert [General Appearance - In No Acute Distress] : in no acute distress [Sclera] : the sclera and conjunctiva were normal [PERRL With Normal Accommodation] : pupils were equal in size, round, and reactive to light [Extraocular Movements] : extraocular movements were intact [Outer Ear] : the ears and nose were normal in appearance [Oropharynx] : the oropharynx was normal [Neck Appearance] : the appearance of the neck was normal [Neck Cervical Mass (___cm)] : no neck mass was observed [Jugular Venous Distention Increased] : there was no jugular-venous distention [Thyroid Diffuse Enlargement] : the thyroid was not enlarged [Thyroid Nodule] : there were no palpable thyroid nodules [Auscultation Breath Sounds / Voice Sounds] : lungs were clear to auscultation bilaterally [Heart Rate And Rhythm] : heart rate was normal and rhythm regular [Heart Sounds] : normal S1 and S2 [Heart Sounds Gallop] : no gallops [Murmurs] : no murmurs [Heart Sounds Pericardial Friction Rub] : no pericardial rub [Full Pulse] : the pedal pulses are present [Edema] : there was no peripheral edema [Bowel Sounds] : normal bowel sounds [Abdomen Soft] : soft [Abdomen Tenderness] : non-tender [Abdomen Mass (___ Cm)] : no abdominal mass palpated [No CVA Tenderness] : no ~M costovertebral angle tenderness [No Spinal Tenderness] : no spinal tenderness [Abnormal Walk] : normal gait [Nail Clubbing] : no clubbing  or cyanosis of the fingernails [Musculoskeletal - Swelling] : no joint swelling seen [Motor Tone] : muscle strength and tone were normal [Skin Color & Pigmentation] : normal skin color and pigmentation [Skin Turgor] : normal skin turgor [] : no rash [Motor Exam] : the motor exam was normal [No Focal Deficits] : no focal deficits [Oriented To Time, Place, And Person] : oriented to person, place, and time [Impaired Insight] : insight and judgment were intact [Affect] : the affect was normal

## 2023-12-27 NOTE — ASSESSMENT
[FreeTextEntry1] : #) Kidney Cyst Kidney US noting two complex cysts in the left kidney, the largest being >10cm in size, which warrants CT imaging and Urology referral No microscopic hematuria noted on U/A from performed labwork - We discussed the therapeutic options including decortication or drainage of the cysts, or possible partial nephrectomy if pre-malignant - CT A/P with and without IV contrast ordered - Referral to Urology  #) CKD Stage 3A eGFR-Cystatin C noted to be ~55 ml/min - will monitor kidney function with BMP and Cystatin C prior to next appointment - Minimal proteinuria noted on most recent testing  #) ?Nephrotic Syndrome No evidence of nephrotic syndrome on performed labwork  #) Essential HTN:  BP controlled in clinic today  - advised salt restrictive diet of <2.4gm daily  - continue to monitor home BP readings and report in future clinic appointments  - continue use of Lasix 20mg daily, and Valsartan-HCTZ 320mg/12.5mg daily  #) Dyslipidemia/Hypertriglyceridemia LDL Controlled, TriG >300 - Continue use of Atorvastatin - Advised patient to increase outpatient use of Fish Oil to 4 gm daily

## 2023-12-27 NOTE — HISTORY OF PRESENT ILLNESS
[FreeTextEntry1] : Mattie Liao is a 79 yo F with history of HTN, dyslipidemia, obesity and chronic LBP, being seen in follow-up for evaluation of kidney cyst.    Kidney US performed 12/18/23 noted b/l multiple cysts of both simple and complex nature.   Her largest was an exophytic cyst measuring 11.6 x 10.6 x 1.8cm in the upper pole of the left kidney.  Another septated cyst was seen in the midpole of the left kidney measuring 4.4 x 3.7 x 3.7 cm.  Also was noted fatty infiltration of the liver.  Labwork was performed as well, which noted mild kidney impairment (Cystatin C 1.21 with eGFR-Cystatin C of 53 ml/min), normal LFT's and Bilirubin, LDL 87, TriG 319, UACR 33 mg/g Cr and UPCR 0.1 g/g Cr.  An MRI of her spine has also recently been performed noting multiple levels of disc bulging and foramen narrowing.   Recent Hospitalizations: none Recent Medications changes: none NSAIDS use: denies use Home BP: no abnormal readings noted Home FSBS:  no abnormal readings noted Uremic Symptoms: no pruritis, metallic taste, new onset weakness, dysphagia, poor appetite, or tremors noted FamHx CKD/RRT:  denies Personal History of CKD/RRT:  denies

## 2024-05-23 ENCOUNTER — APPOINTMENT (OUTPATIENT)
Dept: NEPHROLOGY | Facility: CLINIC | Age: 79
End: 2024-05-23

## 2024-06-13 ENCOUNTER — APPOINTMENT (OUTPATIENT)
Dept: NEUROLOGY | Facility: CLINIC | Age: 79
End: 2024-06-13
Payer: MEDICARE

## 2024-06-13 VITALS
SYSTOLIC BLOOD PRESSURE: 103 MMHG | BODY MASS INDEX: 38.32 KG/M2 | RESPIRATION RATE: 17 BRPM | HEART RATE: 74 BPM | WEIGHT: 230 LBS | HEIGHT: 65 IN | OXYGEN SATURATION: 94 % | DIASTOLIC BLOOD PRESSURE: 71 MMHG

## 2024-06-13 DIAGNOSIS — R26.2 DIFFICULTY IN WALKING, NOT ELSEWHERE CLASSIFIED: ICD-10-CM

## 2024-06-13 DIAGNOSIS — R41.3 OTHER AMNESIA: ICD-10-CM

## 2024-06-13 DIAGNOSIS — M54.18 RADICULOPATHY, SACRAL AND SACROCOCCYGEAL REGION: ICD-10-CM

## 2024-06-13 DIAGNOSIS — M33.20 POLYMYOSITIS, ORGAN INVOLVEMENT UNSPECIFIED: ICD-10-CM

## 2024-06-13 DIAGNOSIS — M54.6 PAIN IN THORACIC SPINE: ICD-10-CM

## 2024-06-13 DIAGNOSIS — G89.29 PAIN IN THORACIC SPINE: ICD-10-CM

## 2024-06-13 PROCEDURE — 99213 OFFICE O/P EST LOW 20 MIN: CPT

## 2024-06-13 NOTE — DISCUSSION/SUMMARY
[FreeTextEntry1] : Due to improvement of symptoms of S1 radiculopathy with PT, hold further intervention.

## 2024-06-13 NOTE — HISTORY OF PRESENT ILLNESS
[FreeTextEntry1] : Consulted nephrologist. No condition found.  Ongoing PT for low back pain, some pain left posterior hip region intermittently. Still has poor balance. No fall. Denies ongoing weakness. Received injectable treatment for left knee.  No persistent mid-back pain. No longer feels numb in feet. Denies stiffness.  B/B are normal.   No worsening of cognitive difficulty. No episodes of confusion.

## 2024-06-13 NOTE — PHYSICAL EXAM
[FreeTextEntry1] : General Appearance - Well groomed, Not in acute distress. Build & Nutrition - Well nourished.  Head and Neck Head - normocephalic, atraumatic with no lesions or palpable masses.  Neurologic Mental Status Affect - normal. Speech - Normal. Thought content/perception - Normal. Cognitive function - Normal. Cranial Nerves I Olfactory - Normal. II Optic - Visual fields - Normal. III Oculomotor - Normal Bilaterally. IV Trochlear - Bilateral - Normal - Bilateral. V Trigeminal - Normal Bilaterally. VI Abducens - Bilateral - Normal - Bilateral. VII Facial - Normal Bilaterally. VIII Acoustic - Bilateral - Hearing normal - Bilateral. IX Glossopharyngeal / X Vagus - Normal. XI Accessory - Normal Bilaterally. XII Hypoglossal - Bilateral - Normal - Bilateral. Sensory Light Touch - Intact - Globally. Pain - Intact - Globally. Temperature - Intact - Globally. Vibration - Decreased - Distal extremities-"stocking-glove" pattern (up to ankles). Proprioception - Bilateral - Normal - Bilateral. Motor Bulk and Contour - Normal. Tone - normal - Left Lower Extremity and Right Lower Extremity. Note: minimal. Strength - 5/5 normal muscle strength - All Muscles (except graded weak muscles). 5-/5 reduced muscle strength - Note: bilateral hip flexion. Reflexes (Dermatomes) 3/2 Brisk - in upper extremities and knees. Ankles: 1-2+. Plantar Reflexes (L4-S2) - Flexion on both sides. Coordination - Transfer from sitting to standing with assistance (holding on arm rest) , Tandem walking impaired, Walking on toes impaired (not able to stand with left foot alone) , Walking on heels impaired and Romberg sign positive. Gait - Small steps and Medium-based.  Results of Diagnostic Studies  Labs: Note: 12/28/18 mild elevated alpha 1 and alpha 2 protein. 8/2020 routine, TFT, LFT normal. 9/9/20 remarkable for ALT 48, low albumin and gamma globulin, suggestive of nephrotic syndrome. 8/22/23 lab remarkable for ALT 31, low albumin and gamma globulin, elevated alpha 2 globulin, suggestive of nephrotic syndrome, 3 IgG band positive in Lyme disease antibodies, otherwise unremarkable. 9/15/23 Lyme: negative.  Imaging: MRI: Note: 10/22/18 brain: reported small amount of ischemic changes. Images reviewed, subtle right superior frontal meningeal thickening. 11/30/18 brain with contrast: no new findings. 8/21/2020 brain with contrast: reported same findings, compared with study in 6/2020. Images reviewed. 9/5/23 brain with and without contrast: reported mild ischemic changes. Images reviewed. CT Scan: Note: 12/12/18 abdominal: no mass lesion in abdomen and pelvis. 6/29/2020 CTA chest: reported no intra chest pathologies, large renal cysts bilaterally. 7/28/2020 thoracic spine X-ray reported unremarkable.  Neurophysiological Testing - Note: 12/22/18 NCS/EMG: myopathy, generalized. 8/18/2020 EEG: normal. 9/2/2020 NCS/EMG: chronic generalized myopathic dysfunction. 12/14/23 NCS/EMG: bilateral S1 radiculopathy.  Other Testing - Note: 8/18/2020 MoCA 23/2020. Not a pattern of memory loss.  Other pertinent findings - Note: 2+ pitting edema distal lower extremities.  Neuropsychiatric Mental status exam performed with findings of - no evidence of hallucinations, delusions, obsessions or homicidal/suicidal ideation.

## 2024-09-09 ENCOUNTER — APPOINTMENT (OUTPATIENT)
Age: 79
End: 2024-09-09

## 2024-09-09 ENCOUNTER — OUTPATIENT (OUTPATIENT)
Dept: OUTPATIENT SERVICES | Facility: HOSPITAL | Age: 79
LOS: 1 days | End: 2024-09-09
Payer: MEDICARE

## 2024-09-09 DIAGNOSIS — I25.119 ATHEROSCLEROTIC HEART DISEASE OF NATIVE CORONARY ARTERY WITH UNSPECIFIED ANGINA PECTORIS: ICD-10-CM

## 2024-09-09 PROCEDURE — 93798 PHYS/QHP OP CAR RHAB W/ECG: CPT

## 2024-09-10 DIAGNOSIS — I25.119 ATHEROSCLEROTIC HEART DISEASE OF NATIVE CORONARY ARTERY WITH UNSPECIFIED ANGINA PECTORIS: ICD-10-CM

## 2024-09-16 ENCOUNTER — APPOINTMENT (OUTPATIENT)
Age: 79
End: 2024-09-16

## 2024-09-16 ENCOUNTER — OUTPATIENT (OUTPATIENT)
Dept: OUTPATIENT SERVICES | Facility: HOSPITAL | Age: 79
LOS: 1 days | End: 2024-09-16

## 2024-09-16 DIAGNOSIS — I25.119 ATHEROSCLEROTIC HEART DISEASE OF NATIVE CORONARY ARTERY WITH UNSPECIFIED ANGINA PECTORIS: ICD-10-CM

## 2024-09-16 DIAGNOSIS — Z90.49 ACQUIRED ABSENCE OF OTHER SPECIFIED PARTS OF DIGESTIVE TRACT: Chronic | ICD-10-CM

## 2024-09-20 ENCOUNTER — APPOINTMENT (OUTPATIENT)
Age: 79
End: 2024-09-20

## 2024-09-20 ENCOUNTER — OUTPATIENT (OUTPATIENT)
Dept: OUTPATIENT SERVICES | Facility: HOSPITAL | Age: 79
LOS: 1 days | End: 2024-09-20

## 2024-09-20 DIAGNOSIS — Z90.49 ACQUIRED ABSENCE OF OTHER SPECIFIED PARTS OF DIGESTIVE TRACT: Chronic | ICD-10-CM

## 2024-09-20 DIAGNOSIS — I25.119 ATHEROSCLEROTIC HEART DISEASE OF NATIVE CORONARY ARTERY WITH UNSPECIFIED ANGINA PECTORIS: ICD-10-CM

## 2024-09-23 ENCOUNTER — OUTPATIENT (OUTPATIENT)
Dept: OUTPATIENT SERVICES | Facility: HOSPITAL | Age: 79
LOS: 1 days | End: 2024-09-23

## 2024-09-23 ENCOUNTER — APPOINTMENT (OUTPATIENT)
Age: 79
End: 2024-09-23

## 2024-09-23 DIAGNOSIS — Z90.49 ACQUIRED ABSENCE OF OTHER SPECIFIED PARTS OF DIGESTIVE TRACT: Chronic | ICD-10-CM

## 2024-09-23 DIAGNOSIS — I25.119 ATHEROSCLEROTIC HEART DISEASE OF NATIVE CORONARY ARTERY WITH UNSPECIFIED ANGINA PECTORIS: ICD-10-CM

## 2024-09-27 ENCOUNTER — APPOINTMENT (OUTPATIENT)
Age: 79
End: 2024-09-27

## 2024-09-27 ENCOUNTER — OUTPATIENT (OUTPATIENT)
Dept: OUTPATIENT SERVICES | Facility: HOSPITAL | Age: 79
LOS: 1 days | End: 2024-09-27

## 2024-09-27 DIAGNOSIS — Z90.49 ACQUIRED ABSENCE OF OTHER SPECIFIED PARTS OF DIGESTIVE TRACT: Chronic | ICD-10-CM

## 2024-09-27 DIAGNOSIS — I25.119 ATHEROSCLEROTIC HEART DISEASE OF NATIVE CORONARY ARTERY WITH UNSPECIFIED ANGINA PECTORIS: ICD-10-CM

## 2024-09-30 ENCOUNTER — OUTPATIENT (OUTPATIENT)
Dept: OUTPATIENT SERVICES | Facility: HOSPITAL | Age: 79
LOS: 1 days | End: 2024-09-30

## 2024-09-30 ENCOUNTER — APPOINTMENT (OUTPATIENT)
Age: 79
End: 2024-09-30

## 2024-09-30 DIAGNOSIS — Z90.49 ACQUIRED ABSENCE OF OTHER SPECIFIED PARTS OF DIGESTIVE TRACT: Chronic | ICD-10-CM

## 2024-09-30 DIAGNOSIS — I25.119 ATHEROSCLEROTIC HEART DISEASE OF NATIVE CORONARY ARTERY WITH UNSPECIFIED ANGINA PECTORIS: ICD-10-CM

## 2024-10-04 ENCOUNTER — OUTPATIENT (OUTPATIENT)
Dept: OUTPATIENT SERVICES | Facility: HOSPITAL | Age: 79
LOS: 1 days | End: 2024-10-04
Payer: MEDICARE

## 2024-10-04 ENCOUNTER — APPOINTMENT (OUTPATIENT)
Age: 79
End: 2024-10-04

## 2024-10-04 DIAGNOSIS — Z90.49 ACQUIRED ABSENCE OF OTHER SPECIFIED PARTS OF DIGESTIVE TRACT: Chronic | ICD-10-CM

## 2024-10-04 DIAGNOSIS — I25.119 ATHEROSCLEROTIC HEART DISEASE OF NATIVE CORONARY ARTERY WITH UNSPECIFIED ANGINA PECTORIS: ICD-10-CM

## 2024-10-04 PROCEDURE — 93798 PHYS/QHP OP CAR RHAB W/ECG: CPT

## 2024-10-05 DIAGNOSIS — I25.119 ATHEROSCLEROTIC HEART DISEASE OF NATIVE CORONARY ARTERY WITH UNSPECIFIED ANGINA PECTORIS: ICD-10-CM

## 2024-10-07 ENCOUNTER — OUTPATIENT (OUTPATIENT)
Dept: OUTPATIENT SERVICES | Facility: HOSPITAL | Age: 79
LOS: 1 days | End: 2024-10-07

## 2024-10-07 ENCOUNTER — APPOINTMENT (OUTPATIENT)
Age: 79
End: 2024-10-07

## 2024-10-07 DIAGNOSIS — Z90.49 ACQUIRED ABSENCE OF OTHER SPECIFIED PARTS OF DIGESTIVE TRACT: Chronic | ICD-10-CM

## 2024-10-07 DIAGNOSIS — I25.119 ATHEROSCLEROTIC HEART DISEASE OF NATIVE CORONARY ARTERY WITH UNSPECIFIED ANGINA PECTORIS: ICD-10-CM

## 2024-10-11 ENCOUNTER — OUTPATIENT (OUTPATIENT)
Dept: OUTPATIENT SERVICES | Facility: HOSPITAL | Age: 79
LOS: 1 days | End: 2024-10-11

## 2024-10-11 ENCOUNTER — APPOINTMENT (OUTPATIENT)
Age: 79
End: 2024-10-11

## 2024-10-11 DIAGNOSIS — I25.119 ATHEROSCLEROTIC HEART DISEASE OF NATIVE CORONARY ARTERY WITH UNSPECIFIED ANGINA PECTORIS: ICD-10-CM

## 2024-10-11 DIAGNOSIS — Z90.49 ACQUIRED ABSENCE OF OTHER SPECIFIED PARTS OF DIGESTIVE TRACT: Chronic | ICD-10-CM

## 2024-10-14 ENCOUNTER — OUTPATIENT (OUTPATIENT)
Dept: OUTPATIENT SERVICES | Facility: HOSPITAL | Age: 79
LOS: 1 days | End: 2024-10-14

## 2024-10-14 ENCOUNTER — APPOINTMENT (OUTPATIENT)
Age: 79
End: 2024-10-14

## 2024-10-14 DIAGNOSIS — I25.119 ATHEROSCLEROTIC HEART DISEASE OF NATIVE CORONARY ARTERY WITH UNSPECIFIED ANGINA PECTORIS: ICD-10-CM

## 2024-10-14 DIAGNOSIS — Z90.49 ACQUIRED ABSENCE OF OTHER SPECIFIED PARTS OF DIGESTIVE TRACT: Chronic | ICD-10-CM

## 2024-10-18 ENCOUNTER — APPOINTMENT (OUTPATIENT)
Age: 79
End: 2024-10-18

## 2024-10-18 ENCOUNTER — OUTPATIENT (OUTPATIENT)
Dept: OUTPATIENT SERVICES | Facility: HOSPITAL | Age: 79
LOS: 1 days | End: 2024-10-18

## 2024-10-18 DIAGNOSIS — Z90.49 ACQUIRED ABSENCE OF OTHER SPECIFIED PARTS OF DIGESTIVE TRACT: Chronic | ICD-10-CM

## 2024-10-18 DIAGNOSIS — I25.119 ATHEROSCLEROTIC HEART DISEASE OF NATIVE CORONARY ARTERY WITH UNSPECIFIED ANGINA PECTORIS: ICD-10-CM

## 2024-10-21 ENCOUNTER — APPOINTMENT (OUTPATIENT)
Age: 79
End: 2024-10-21

## 2024-10-21 ENCOUNTER — OUTPATIENT (OUTPATIENT)
Dept: OUTPATIENT SERVICES | Facility: HOSPITAL | Age: 79
LOS: 1 days | End: 2024-10-21

## 2024-10-21 DIAGNOSIS — I25.119 ATHEROSCLEROTIC HEART DISEASE OF NATIVE CORONARY ARTERY WITH UNSPECIFIED ANGINA PECTORIS: ICD-10-CM

## 2024-10-21 DIAGNOSIS — Z90.49 ACQUIRED ABSENCE OF OTHER SPECIFIED PARTS OF DIGESTIVE TRACT: Chronic | ICD-10-CM

## 2024-10-25 ENCOUNTER — APPOINTMENT (OUTPATIENT)
Age: 79
End: 2024-10-25

## 2024-10-25 ENCOUNTER — OUTPATIENT (OUTPATIENT)
Dept: OUTPATIENT SERVICES | Facility: HOSPITAL | Age: 79
LOS: 1 days | End: 2024-10-25

## 2024-10-25 DIAGNOSIS — Z90.49 ACQUIRED ABSENCE OF OTHER SPECIFIED PARTS OF DIGESTIVE TRACT: Chronic | ICD-10-CM

## 2024-10-25 DIAGNOSIS — I25.119 ATHEROSCLEROTIC HEART DISEASE OF NATIVE CORONARY ARTERY WITH UNSPECIFIED ANGINA PECTORIS: ICD-10-CM

## 2024-10-28 ENCOUNTER — APPOINTMENT (OUTPATIENT)
Age: 79
End: 2024-10-28

## 2024-11-01 ENCOUNTER — APPOINTMENT (OUTPATIENT)
Age: 79
End: 2024-11-01

## 2024-11-04 ENCOUNTER — APPOINTMENT (OUTPATIENT)
Age: 79
End: 2024-11-04

## 2024-11-08 ENCOUNTER — APPOINTMENT (OUTPATIENT)
Age: 79
End: 2024-11-08

## 2024-11-11 ENCOUNTER — APPOINTMENT (OUTPATIENT)
Age: 79
End: 2024-11-11

## 2024-11-15 ENCOUNTER — APPOINTMENT (OUTPATIENT)
Age: 79
End: 2024-11-15

## 2024-11-18 ENCOUNTER — APPOINTMENT (OUTPATIENT)
Age: 79
End: 2024-11-18

## 2024-11-18 ENCOUNTER — OUTPATIENT (OUTPATIENT)
Dept: OUTPATIENT SERVICES | Facility: HOSPITAL | Age: 79
LOS: 1 days | End: 2024-11-18
Payer: MEDICARE

## 2024-11-18 DIAGNOSIS — Z90.49 ACQUIRED ABSENCE OF OTHER SPECIFIED PARTS OF DIGESTIVE TRACT: Chronic | ICD-10-CM

## 2024-11-18 DIAGNOSIS — I25.119 ATHEROSCLEROTIC HEART DISEASE OF NATIVE CORONARY ARTERY WITH UNSPECIFIED ANGINA PECTORIS: ICD-10-CM

## 2024-11-18 PROCEDURE — 93798 PHYS/QHP OP CAR RHAB W/ECG: CPT

## 2024-11-19 DIAGNOSIS — I25.119 ATHEROSCLEROTIC HEART DISEASE OF NATIVE CORONARY ARTERY WITH UNSPECIFIED ANGINA PECTORIS: ICD-10-CM

## 2024-11-22 ENCOUNTER — APPOINTMENT (OUTPATIENT)
Age: 79
End: 2024-11-22

## 2024-11-22 ENCOUNTER — OUTPATIENT (OUTPATIENT)
Dept: OUTPATIENT SERVICES | Facility: HOSPITAL | Age: 79
LOS: 1 days | End: 2024-11-22

## 2024-11-22 DIAGNOSIS — Z90.49 ACQUIRED ABSENCE OF OTHER SPECIFIED PARTS OF DIGESTIVE TRACT: Chronic | ICD-10-CM

## 2024-11-22 DIAGNOSIS — I25.119 ATHEROSCLEROTIC HEART DISEASE OF NATIVE CORONARY ARTERY WITH UNSPECIFIED ANGINA PECTORIS: ICD-10-CM

## 2024-11-25 ENCOUNTER — APPOINTMENT (OUTPATIENT)
Age: 79
End: 2024-11-25

## 2024-11-25 ENCOUNTER — OUTPATIENT (OUTPATIENT)
Dept: OUTPATIENT SERVICES | Facility: HOSPITAL | Age: 79
LOS: 1 days | End: 2024-11-25

## 2024-11-25 DIAGNOSIS — Z90.49 ACQUIRED ABSENCE OF OTHER SPECIFIED PARTS OF DIGESTIVE TRACT: Chronic | ICD-10-CM

## 2024-11-25 DIAGNOSIS — I25.119 ATHEROSCLEROTIC HEART DISEASE OF NATIVE CORONARY ARTERY WITH UNSPECIFIED ANGINA PECTORIS: ICD-10-CM

## 2024-11-29 ENCOUNTER — OUTPATIENT (OUTPATIENT)
Dept: OUTPATIENT SERVICES | Facility: HOSPITAL | Age: 79
LOS: 1 days | End: 2024-11-29

## 2024-11-29 ENCOUNTER — APPOINTMENT (OUTPATIENT)
Age: 79
End: 2024-11-29

## 2024-11-29 DIAGNOSIS — Z90.49 ACQUIRED ABSENCE OF OTHER SPECIFIED PARTS OF DIGESTIVE TRACT: Chronic | ICD-10-CM

## 2024-11-29 DIAGNOSIS — I25.119 ATHEROSCLEROTIC HEART DISEASE OF NATIVE CORONARY ARTERY WITH UNSPECIFIED ANGINA PECTORIS: ICD-10-CM

## 2024-12-02 ENCOUNTER — OUTPATIENT (OUTPATIENT)
Dept: OUTPATIENT SERVICES | Facility: HOSPITAL | Age: 79
LOS: 1 days | End: 2024-12-02
Payer: MEDICARE

## 2024-12-02 ENCOUNTER — APPOINTMENT (OUTPATIENT)
Age: 79
End: 2024-12-02

## 2024-12-02 DIAGNOSIS — I25.119 ATHEROSCLEROTIC HEART DISEASE OF NATIVE CORONARY ARTERY WITH UNSPECIFIED ANGINA PECTORIS: ICD-10-CM

## 2024-12-02 DIAGNOSIS — Z90.49 ACQUIRED ABSENCE OF OTHER SPECIFIED PARTS OF DIGESTIVE TRACT: Chronic | ICD-10-CM

## 2024-12-02 PROCEDURE — 93798 PHYS/QHP OP CAR RHAB W/ECG: CPT

## 2024-12-03 DIAGNOSIS — I25.119 ATHEROSCLEROTIC HEART DISEASE OF NATIVE CORONARY ARTERY WITH UNSPECIFIED ANGINA PECTORIS: ICD-10-CM

## 2024-12-06 ENCOUNTER — APPOINTMENT (OUTPATIENT)
Age: 79
End: 2024-12-06

## 2024-12-06 ENCOUNTER — OUTPATIENT (OUTPATIENT)
Dept: OUTPATIENT SERVICES | Facility: HOSPITAL | Age: 79
LOS: 1 days | End: 2024-12-06

## 2024-12-06 DIAGNOSIS — I25.119 ATHEROSCLEROTIC HEART DISEASE OF NATIVE CORONARY ARTERY WITH UNSPECIFIED ANGINA PECTORIS: ICD-10-CM

## 2024-12-06 DIAGNOSIS — Z90.49 ACQUIRED ABSENCE OF OTHER SPECIFIED PARTS OF DIGESTIVE TRACT: Chronic | ICD-10-CM

## 2024-12-09 ENCOUNTER — OUTPATIENT (OUTPATIENT)
Dept: OUTPATIENT SERVICES | Facility: HOSPITAL | Age: 79
LOS: 1 days | End: 2024-12-09

## 2024-12-09 ENCOUNTER — APPOINTMENT (OUTPATIENT)
Age: 79
End: 2024-12-09

## 2024-12-09 DIAGNOSIS — Z90.49 ACQUIRED ABSENCE OF OTHER SPECIFIED PARTS OF DIGESTIVE TRACT: Chronic | ICD-10-CM

## 2024-12-09 DIAGNOSIS — I25.119 ATHEROSCLEROTIC HEART DISEASE OF NATIVE CORONARY ARTERY WITH UNSPECIFIED ANGINA PECTORIS: ICD-10-CM

## 2024-12-13 ENCOUNTER — APPOINTMENT (OUTPATIENT)
Age: 79
End: 2024-12-13

## 2024-12-13 ENCOUNTER — OUTPATIENT (OUTPATIENT)
Dept: OUTPATIENT SERVICES | Facility: HOSPITAL | Age: 79
LOS: 1 days | End: 2024-12-13

## 2024-12-13 DIAGNOSIS — I25.119 ATHEROSCLEROTIC HEART DISEASE OF NATIVE CORONARY ARTERY WITH UNSPECIFIED ANGINA PECTORIS: ICD-10-CM

## 2024-12-13 DIAGNOSIS — Z90.49 ACQUIRED ABSENCE OF OTHER SPECIFIED PARTS OF DIGESTIVE TRACT: Chronic | ICD-10-CM

## 2024-12-16 ENCOUNTER — OUTPATIENT (OUTPATIENT)
Dept: OUTPATIENT SERVICES | Facility: HOSPITAL | Age: 79
LOS: 1 days | End: 2024-12-16

## 2024-12-16 ENCOUNTER — APPOINTMENT (OUTPATIENT)
Age: 79
End: 2024-12-16

## 2024-12-16 DIAGNOSIS — Z90.49 ACQUIRED ABSENCE OF OTHER SPECIFIED PARTS OF DIGESTIVE TRACT: Chronic | ICD-10-CM

## 2024-12-16 DIAGNOSIS — I25.119 ATHEROSCLEROTIC HEART DISEASE OF NATIVE CORONARY ARTERY WITH UNSPECIFIED ANGINA PECTORIS: ICD-10-CM

## 2024-12-20 ENCOUNTER — APPOINTMENT (OUTPATIENT)
Age: 79
End: 2024-12-20

## 2024-12-20 ENCOUNTER — OUTPATIENT (OUTPATIENT)
Dept: OUTPATIENT SERVICES | Facility: HOSPITAL | Age: 79
LOS: 1 days | End: 2024-12-20

## 2024-12-20 DIAGNOSIS — I25.119 ATHEROSCLEROTIC HEART DISEASE OF NATIVE CORONARY ARTERY WITH UNSPECIFIED ANGINA PECTORIS: ICD-10-CM

## 2024-12-20 DIAGNOSIS — Z90.49 ACQUIRED ABSENCE OF OTHER SPECIFIED PARTS OF DIGESTIVE TRACT: Chronic | ICD-10-CM

## 2024-12-23 ENCOUNTER — APPOINTMENT (OUTPATIENT)
Age: 79
End: 2024-12-23

## 2024-12-27 ENCOUNTER — APPOINTMENT (OUTPATIENT)
Age: 79
End: 2024-12-27

## 2024-12-30 ENCOUNTER — APPOINTMENT (OUTPATIENT)
Age: 79
End: 2024-12-30

## 2025-01-03 ENCOUNTER — APPOINTMENT (OUTPATIENT)
Age: 80
End: 2025-01-03

## 2025-01-06 ENCOUNTER — APPOINTMENT (OUTPATIENT)
Age: 80
End: 2025-01-06

## 2025-01-10 ENCOUNTER — APPOINTMENT (OUTPATIENT)
Age: 80
End: 2025-01-10

## 2025-01-13 ENCOUNTER — APPOINTMENT (OUTPATIENT)
Age: 80
End: 2025-01-13

## 2025-01-17 ENCOUNTER — APPOINTMENT (OUTPATIENT)
Age: 80
End: 2025-01-17

## 2025-01-24 ENCOUNTER — APPOINTMENT (OUTPATIENT)
Age: 80
End: 2025-01-24